# Patient Record
Sex: FEMALE | Race: WHITE | Employment: OTHER
[De-identification: names, ages, dates, MRNs, and addresses within clinical notes are randomized per-mention and may not be internally consistent; named-entity substitution may affect disease eponyms.]

---

## 2017-10-01 ENCOUNTER — HEALTH MAINTENANCE LETTER (OUTPATIENT)
Age: 47
End: 2017-10-01

## 2020-02-23 ENCOUNTER — HEALTH MAINTENANCE LETTER (OUTPATIENT)
Age: 50
End: 2020-02-23

## 2020-12-12 ENCOUNTER — HEALTH MAINTENANCE LETTER (OUTPATIENT)
Age: 50
End: 2020-12-12

## 2021-04-11 ENCOUNTER — HEALTH MAINTENANCE LETTER (OUTPATIENT)
Age: 51
End: 2021-04-11

## 2021-09-26 ENCOUNTER — HEALTH MAINTENANCE LETTER (OUTPATIENT)
Age: 51
End: 2021-09-26

## 2022-01-16 ENCOUNTER — HEALTH MAINTENANCE LETTER (OUTPATIENT)
Age: 52
End: 2022-01-16

## 2022-03-07 ENCOUNTER — TRANSFERRED RECORDS (OUTPATIENT)
Dept: HEALTH INFORMATION MANAGEMENT | Facility: CLINIC | Age: 52
End: 2022-03-07

## 2022-04-14 ENCOUNTER — TRANSFERRED RECORDS (OUTPATIENT)
Dept: OPTOMETRY | Facility: CLINIC | Age: 52
End: 2022-04-14

## 2022-05-08 ENCOUNTER — HEALTH MAINTENANCE LETTER (OUTPATIENT)
Age: 52
End: 2022-05-08

## 2023-01-08 ENCOUNTER — HEALTH MAINTENANCE LETTER (OUTPATIENT)
Age: 53
End: 2023-01-08

## 2023-01-23 ENCOUNTER — LAB (OUTPATIENT)
Dept: FAMILY MEDICINE | Facility: CLINIC | Age: 53
End: 2023-01-23
Attending: NURSE PRACTITIONER
Payer: COMMERCIAL

## 2023-01-23 ENCOUNTER — TELEPHONE (OUTPATIENT)
Dept: FAMILY MEDICINE | Facility: CLINIC | Age: 53
End: 2023-01-23
Payer: COMMERCIAL

## 2023-01-23 ENCOUNTER — E-VISIT (OUTPATIENT)
Dept: URGENT CARE | Facility: CLINIC | Age: 53
End: 2023-01-23
Payer: COMMERCIAL

## 2023-01-23 DIAGNOSIS — Z20.822 SUSPECTED COVID-19 VIRUS INFECTION: Primary | ICD-10-CM

## 2023-01-23 DIAGNOSIS — Z20.822 SUSPECTED COVID-19 VIRUS INFECTION: ICD-10-CM

## 2023-01-23 LAB
DEPRECATED S PYO AG THROAT QL EIA: NEGATIVE
GROUP A STREP BY PCR: NOT DETECTED

## 2023-01-23 PROCEDURE — 87651 STREP A DNA AMP PROBE: CPT

## 2023-01-23 PROCEDURE — U0003 INFECTIOUS AGENT DETECTION BY NUCLEIC ACID (DNA OR RNA); SEVERE ACUTE RESPIRATORY SYNDROME CORONAVIRUS 2 (SARS-COV-2) (CORONAVIRUS DISEASE [COVID-19]), AMPLIFIED PROBE TECHNIQUE, MAKING USE OF HIGH THROUGHPUT TECHNOLOGIES AS DESCRIBED BY CMS-2020-01-R: HCPCS

## 2023-01-23 PROCEDURE — 99421 OL DIG E/M SVC 5-10 MIN: CPT | Mod: CS | Performed by: NURSE PRACTITIONER

## 2023-01-23 PROCEDURE — U0005 INFEC AGEN DETEC AMPLI PROBE: HCPCS

## 2023-01-23 PROCEDURE — 99207 PR NO CHARGE NURSE ONLY: CPT

## 2023-01-23 NOTE — PATIENT INSTRUCTIONS
Dear Rachele,      Based on your responses, you may have COVID-19.     Will I be tested for COVID-19?  We would like to test you for COVID. I have placed orders for this test.     To schedule: go to your [employer] home page and scroll down to the section that says  You have an appointment that needs to be scheduled  and click the large green button that says  Schedule Now  and follow the steps to find the next available openings.    If you are unable to complete these ProofP[employer] scheduling steps, please call [phone] to schedule your testing.     How do I self-isolate?  You isolate when you have symptoms of COVID or a test shows you have COVID, even if you don t have symptoms.     If you DO have symptoms:  o Stay home and away from others  - For at least 5 days after your symptoms started, AND   - You are fever free for 24 hours (with no medicine that reduces fever), AND  - Your other symptoms are better.  o Wear a mask for 10 full days any time you are around others.    If you DON T have symptoms:  o Stay at home and away from others for at least 5 days after your positive test.  o Wear a mask for 10 full days any time you are around others.    How can I take care of myself?  Over the counter medications may help with your symptoms such as runny or stuffy nose, cough, chills, or fever.  Talk to your care team about your options.     Some people are at high risk of severe illness (for example, you have a weak immune system, you re 65 years or older, or you have certain medical problems). If your risk is high and your symptoms started in the last 5 days, we strongly recommend for you to get COVID treatment as soon as possible. Paxlovid and Molnupiravir are proven safe and effective, make you feel better faster, and prevent hospitalization and death.       To schedule an appointment to discuss COVID treatment, request an appointment on [employer] (select  COVID-19 Treatment ) or call BAUTISTA ([phone]).       Get lots of rest. Drink extra fluids (unless a doctor has told you not to)    Take Tylenol (acetaminophen) or ibuprofen for fever or pain. If you have liver or kidney problems, ask your family doctor if it's okay to take Tylenol or ibuprofen    Take over the counter medications for your symptoms, as directed by your doctor. You may also talk to your pharmacist.      If you have other health problems (like cancer, heart failure, an organ transplant or severe kidney disease): Call your specialty clinic if you don't feel better in the next 2 days.    Know when to call 911. Emergency warning signs include:  o Trouble breathing or shortness of breath  o Pain or pressure in the chest that doesn't go away  o Feeling confused like you haven't felt before, or not being able to wake up  o Bluish-colored lips or face    Where can I get more information?     [facility] - About COVID-19: [email or web address]     CDC - What to Do If You're Sick: https://www.cdc.gov/coronavirus/2019-ncov/if-you-are-sick/index.html     CDC -  Isolation https://www.cdc.gov/coronavirus/2019-ncov/your-health/isolation.html

## 2023-01-23 NOTE — TELEPHONE ENCOUNTER
"Patient calling to request some sort of lab test for strep and also mentioned a drug test. Patient reports that she was \"exposed at work, my coworker had strep and Covid\", and she started to have symptoms develop on Thursday.    Patient reports on Thursday she started having a sore throat, cough, and throat was scratchy. She took a Covid test and it was negative. Patient then had a fever of 101.5 Thursday night, and then \"it broke overnight\". Patient denies any fever today, but just has a cough, scratchiness, and did visual like a white spot on her throat. Patient retook a Covid test on Friday and it was negative.     RN advised that patient could start a virtual visit with a provider for further assessment, discuss lab orders, and treatment. RN notes that patient does not have a PCP listed, and that UC was an option for the patient if she could not start an e-visit or virtual visit. Patient verbalized understanding and stated that she will try to start an e-visit or virtual visit for her symptoms. No further questions or concerns.    Malia Arshad RN  [facility]    "

## 2023-01-24 LAB — SARS-COV-2 RNA RESP QL NAA+PROBE: NEGATIVE

## 2023-02-24 ENCOUNTER — OFFICE VISIT (OUTPATIENT)
Dept: FAMILY MEDICINE | Facility: CLINIC | Age: 53
End: 2023-02-24
Payer: COMMERCIAL

## 2023-02-24 VITALS
WEIGHT: 290.6 LBS | HEIGHT: 64 IN | OXYGEN SATURATION: 98 % | TEMPERATURE: 97.5 F | RESPIRATION RATE: 14 BRPM | HEART RATE: 82 BPM | SYSTOLIC BLOOD PRESSURE: 137 MMHG | BODY MASS INDEX: 49.61 KG/M2 | DIASTOLIC BLOOD PRESSURE: 83 MMHG

## 2023-02-24 DIAGNOSIS — Z11.59 NEED FOR HEPATITIS C SCREENING TEST: ICD-10-CM

## 2023-02-24 DIAGNOSIS — Z11.4 SCREENING FOR HIV (HUMAN IMMUNODEFICIENCY VIRUS): ICD-10-CM

## 2023-02-24 DIAGNOSIS — Z13.6 CARDIOVASCULAR SCREENING; LDL GOAL LESS THAN 100: ICD-10-CM

## 2023-02-24 DIAGNOSIS — Z12.31 VISIT FOR SCREENING MAMMOGRAM: ICD-10-CM

## 2023-02-24 DIAGNOSIS — Z13.220 SCREENING FOR HYPERLIPIDEMIA: ICD-10-CM

## 2023-02-24 DIAGNOSIS — Z12.11 SCREEN FOR COLON CANCER: ICD-10-CM

## 2023-02-24 DIAGNOSIS — E66.01 MORBID OBESITY (H): ICD-10-CM

## 2023-02-24 DIAGNOSIS — Z12.4 CERVICAL CANCER SCREENING: ICD-10-CM

## 2023-02-24 DIAGNOSIS — E11.9 TYPE 2 DIABETES MELLITUS WITHOUT COMPLICATION, WITHOUT LONG-TERM CURRENT USE OF INSULIN (H): Primary | ICD-10-CM

## 2023-02-24 PROCEDURE — 99214 OFFICE O/P EST MOD 30 MIN: CPT | Performed by: FAMILY MEDICINE

## 2023-02-24 RX ORDER — LOSARTAN POTASSIUM 25 MG/1
25 TABLET ORAL DAILY
Qty: 90 TABLET | Refills: 3 | Status: SHIPPED | OUTPATIENT
Start: 2023-02-24 | End: 2023-12-04

## 2023-02-24 RX ORDER — METFORMIN HCL 500 MG
1000 TABLET, EXTENDED RELEASE 24 HR ORAL 2 TIMES DAILY WITH MEALS
Qty: 360 TABLET | Refills: 3 | Status: SHIPPED | OUTPATIENT
Start: 2023-02-24 | End: 2023-11-30

## 2023-02-24 ASSESSMENT — PAIN SCALES - GENERAL: PAINLEVEL: NO PAIN (0)

## 2023-02-24 NOTE — PROGRESS NOTES
"  Assessment & Plan     Type 2 diabetes mellitus without complication, without long-term current use of insulin (H)  -HbA1c 6.6- 10 months ago.  Currently on metformin XR 1000 mg twice a day.  -On losartan for renal protection.    - Hemoglobin A1c; Future  - Lipid panel reflex to direct LDL Fasting; Future  - metFORMIN (GLUCOPHAGE XR) 500 MG 24 hr tablet; Take 2 tablets (1,000 mg) by mouth 2 times daily (with meals) for 90 days  - losartan (COZAAR) 25 MG tablet; Take 1 tablet (25 mg) by mouth daily  - blood glucose (NO BRAND SPECIFIED) lancets standard; Use to test blood sugar once daily or as directed.Please dispense 1 box  - blood glucose (NO BRAND SPECIFIED) test strip; Use to test blood sugar once daily or as directed.  - Albumin Creatinine Ratio Urine ([facility])    Visit for screening mammogram    - MA SCREENING DIGITAL BILAT - Future  (s+30); Future    Screen for colon cancer    - Colonoscopy Screening  Referral; Future    Screening for HIV (human immunodeficiency virus)    - HIV Antigen Antibody Combo; Future    Need for hepatitis C screening test    - Hepatitis C Screen Reflex to HCV RNA Quant and Genotype; Future    Cervical cancer screening  -Preferred to schedule GYN visit to get Pap done.  History of abnormal Paps in the past.    Screening for hyperlipidemia      Morbid obesity (H)  -Recommended healthy diet and active lifestyle.    CARDIOVASCULAR SCREENING; LDL GOAL LESS THAN 100    - Hemoglobin; Future  - Basic metabolic panel  (Ca, Cl, CO2, Creat, Gluc, K, Na, BUN); Future           BMI:   Estimated body mass index is 49.86 kg/m  as calculated from the following:    Height as of this encounter: 1.626 m (5' 4.02\").    Weight as of this encounter: 131.8 kg (290 lb 9.6 oz).   Weight management plan: Discussed healthy diet and exercise guidelines        Return in about 3 months (around 5/24/2023) for DM follow up.    Roxana Jensen MD  [facility] " POWER Jeffrey is a 52 year old, presenting for the following health issues:  Diabetes      History of Present Illness       Diabetes:   She presents for follow up of diabetes.  She is checking home blood glucose one time daily. She checks blood glucose before meals.  Blood glucose is never over 200 and never under 70. She is aware of hypoglycemia symptoms including shakiness. She has no concerns regarding her diabetes at this time.  She is not experiencing numbness or burning in feet, excessive thirst, blurry vision, weight changes or redness, sores or blisters on feet.         She eats 2-3 servings of fruits and vegetables daily.She consumes 0 sweetened beverage(s) daily.She exercises with enough effort to increase her heart rate 10 to 19 minutes per day.  She exercises with enough effort to increase her heart rate 3 or less days per week.   She is taking medications regularly.         Review of Systems   Constitutional, HEENT, cardiovascular, pulmonary, gi and gu systems are negative, except as otherwise noted.      Objective    There were no vitals taken for this visit.  There is no height or weight on file to calculate BMI.  Physical Exam   GENERAL: healthy, alert and no distress  NECK: no adenopathy, no asymmetry, masses, or scars and thyroid normal to palpation  RESP: lungs clear to auscultation - no rales, rhonchi or wheezes  CV: regular rate and rhythm, normal S1 S2, no S3 or S4, no murmur, click or rub, no peripheral edema and peripheral pulses strong  ABDOMEN: soft, nontender, no hepatosplenomegaly, no masses and bowel sounds normal  MS: no gross musculoskeletal defects noted, no edema

## 2023-02-24 NOTE — PATIENT INSTRUCTIONS
At [facility], we strive to deliver an exceptional experience to you, every time we see you. If you receive a survey, please complete it as we do value your feedback.  If you have MyChart, you can expect to receive results automatically within 24 hours of their completion.  Your provider will send a note interpreting your results as well.   If you do not have MyChart, you should receive your results in about a week by mail.    Your care team:                            Family Medicine Internal Medicine   MD Edgar Melendez MD Shantel Branch-Fleming, MD Srinivasa Vaka, MD Katya Belousova, PABELKYS Mares CNP, MD ([facility]   José Miguel Sagastume, MD Pauline Peguero MD Amelia Massimini APRN VÍCTOR Cabezas APRN MD Roxana Beckford MD          Clinic hours: Monday - Thursday 7 am-6 pm; Fridays 7 am-5 pm.   Urgent care: Monday - Friday 10 am- 8 pm; Saturday and Sunday 9 am-5 pm.    Clinic: [phone]       [facility]: Monday - Thursday 8 am - 7 pm; Friday 8 am - 6 pm  [facility]: [phone]

## 2023-03-02 DIAGNOSIS — E11.9 TYPE 2 DIABETES MELLITUS WITHOUT COMPLICATION, WITHOUT LONG-TERM CURRENT USE OF INSULIN (H): Primary | ICD-10-CM

## 2023-03-08 ENCOUNTER — OFFICE VISIT (OUTPATIENT)
Dept: OPTOMETRY | Facility: CLINIC | Age: 53
End: 2023-03-08
Payer: COMMERCIAL

## 2023-03-08 DIAGNOSIS — H52.31 ANISOMETROPIA: ICD-10-CM

## 2023-03-08 DIAGNOSIS — E11.9 TYPE 2 DIABETES MELLITUS WITHOUT COMPLICATION, WITHOUT LONG-TERM CURRENT USE OF INSULIN (H): Primary | ICD-10-CM

## 2023-03-08 DIAGNOSIS — H52.222 REGULAR ASTIGMATISM OF LEFT EYE: ICD-10-CM

## 2023-03-08 DIAGNOSIS — H52.4 PRESBYOPIA: ICD-10-CM

## 2023-03-08 DIAGNOSIS — H40.039 ANATOMICAL NARROW ANGLE: ICD-10-CM

## 2023-03-08 DIAGNOSIS — H52.03 HYPEROPIA, BILATERAL: ICD-10-CM

## 2023-03-08 PROCEDURE — 92015 DETERMINE REFRACTIVE STATE: CPT | Performed by: OPTOMETRIST

## 2023-03-08 PROCEDURE — 92004 COMPRE OPH EXAM NEW PT 1/>: CPT | Performed by: OPTOMETRIST

## 2023-03-08 ASSESSMENT — CUP TO DISC RATIO
OS_RATIO: 0.2
OD_RATIO: 0.2

## 2023-03-08 ASSESSMENT — VISUAL ACUITY
OD_SC+: -1
OS_PH_SC+: -1
METHOD: SNELLEN - LINEAR
OS_CC: 20/30
OD_SC: 20/70-1
OS_SC+: -2
CORRECTION_TYPE: GLASSES
OD_PH_SC: 20/30
OS_SC: 20/80
OD_PH_SC+: -1
OD_SC: 20/50
OS_PH_SC: 20/40
OD_CC: 20/20-2
OS_SC: 20/200

## 2023-03-08 ASSESSMENT — CONF VISUAL FIELD
OD_NORMAL: 1
OD_SUPERIOR_NASAL_RESTRICTION: 0
METHOD: COUNTING FINGERS
OD_INFERIOR_NASAL_RESTRICTION: 0
OS_SUPERIOR_TEMPORAL_RESTRICTION: 0
OD_INFERIOR_TEMPORAL_RESTRICTION: 0
OS_INFERIOR_NASAL_RESTRICTION: 0
OS_SUPERIOR_NASAL_RESTRICTION: 0
OS_NORMAL: 1
OS_INFERIOR_TEMPORAL_RESTRICTION: 0
OD_SUPERIOR_TEMPORAL_RESTRICTION: 0

## 2023-03-08 ASSESSMENT — REFRACTION_MANIFEST
OS_AXIS: 173
OS_SPHERE: +3.50
OD_SPHERE: +1.50
OS_CYLINDER: +0.75
OD_SPHERE: +1.25
OS_SPHERE: +3.25
OS_CYLINDER: +0.75
OS_AXIS: 180
OD_CYLINDER: SPHERE
OD_ADD: +2.50
OS_ADD: +2.50
METHOD_AUTOREFRACTION: 1

## 2023-03-08 ASSESSMENT — REFRACTION_WEARINGRX
OS_SPHERE: +3.00
OS_ADD: +1.50
OD_ADD: +1.50
OS_AXIS: 165
OS_CYLINDER: +0.50
OD_CYLINDER: SPHERE
OD_SPHERE: +1.75

## 2023-03-08 ASSESSMENT — EXTERNAL EXAM - LEFT EYE: OS_EXAM: NORMAL

## 2023-03-08 ASSESSMENT — TONOMETRY
OD_IOP_MMHG: 18
IOP_METHOD: APPLANATION
OS_IOP_MMHG: 18

## 2023-03-08 ASSESSMENT — KERATOMETRY
OS_K2POWER_DIOPTERS: 43.75
OS_AXISANGLE2_DEGREES: 173
OD_AXISANGLE2_DEGREES: 177
OD_K2POWER_DIOPTERS: 43.75
OS_K1POWER_DIOPTERS: 43.00
OD_K1POWER_DIOPTERS: 42.50

## 2023-03-08 ASSESSMENT — SLIT LAMP EXAM - LIDS
COMMENTS: NORMAL
COMMENTS: NORMAL

## 2023-03-08 ASSESSMENT — EXTERNAL EXAM - RIGHT EYE: OD_EXAM: NORMAL

## 2023-03-08 NOTE — LETTER
3/8/2023         RE: Rachele Marquis  [address] [facility] [address]        Dear Colleague,    Thank you for referring your patient, Rachele Marquis, to the [facility]. No diabetic retinopathy was found at eye exam.  Please see a copy of my visit note below.    Chief Complaint   Patient presents with     Diabetic Eye Exam        Chief Complaint(s) and History of Present Illness(es)     Diabetic Eye Exam            Vision: is stable    Diabetes Type: Type 2 and taking oral medications    Blood Sugars: is controlled               HP  A1C 6.6  4/28/22      Last Eye Exam: Jan/Feb 2022  Dilated Previously: Yes    What are you currently using to see?  Glasses for work/computer   Distance Vision Acuity: Satisfied with vision, fine. Has started to notice that the  in [place] has gotten blurry     Near Vision Acuity: Satisfied with vision while reading  with readers, thinks that they are +2.50's and Satisfied with vision while using computer with computer glasses    Eye Comfort: good  Do you use eye drops? : No  Occupation or Hobbies:  3 screens at work     Eveline Apple Optometric Assistant      Medical, surgical and family histories reviewed and updated 3/8/2023.       OBJECTIVE: See Ophthalmology exam    ASSESSMENT:    ICD-10-CM    1. Type 2 diabetes mellitus without complication, without long-term current use of insulin (H)  E11.9 EYE EXAM (SIMPLE-NONBILLABLE)     REFRACTION    no diabetic retinopathy found at eye exam       2. Anatomical narrow angle  H40.039 EYE EXAM (SIMPLE-NONBILLABLE)     REFRACTION    hx of laser trabeculoplasty 2022      3. Hyperopia, bilateral  H52.03 EYE EXAM (SIMPLE-NONBILLABLE)     REFRACTION      4. Regular astigmatism of left eye  H52.222 EYE EXAM (SIMPLE-NONBILLABLE)     REFRACTION      5. Presbyopia  H52.4 EYE EXAM (SIMPLE-NONBILLABLE)     REFRACTION      6. Anisometropia  H52.31 EYE EXAM (SIMPLE-NONBILLABLE)     REFRACTION           PLAN:    Rachele Marquis aware  eye exam results will be sent to No Ref-Primary, Physician.  Patient Instructions   Fill glasses prescription  Allow 2 weeks to adapt to change in glasses  Keep blood sugar under good control  Return in 1 year for diabetic eye exam      Blood sugar and blood pressure control are very important in the prevention of ocular complications from diabetes.       Jaclyn Valencia, OD  [phone]                        Again, thank you for allowing me to participate in the care of your patient.        Sincerely,        Jaclyn Valencia, OD

## 2023-03-08 NOTE — PROGRESS NOTES
Chief Complaint   Patient presents with     Diabetic Eye Exam        Chief Complaint(s) and History of Present Illness(es)     Diabetic Eye Exam            Vision: is stable    Diabetes Type: Type 2 and taking oral medications    Blood Sugars: is controlled               HP  A1C 6.6  4/28/22      Last Eye Exam: Jan/Feb 2022  Dilated Previously: Yes    What are you currently using to see?  Glasses for work/computer   Distance Vision Acuity: Satisfied with vision, fine. Has started to notice that the  in [place] has gotten blurry     Near Vision Acuity: Satisfied with vision while reading  with readers, thinks that they are +2.50's and Satisfied with vision while using computer with computer glasses    Eye Comfort: good  Do you use eye drops? : No  Occupation or Hobbies:  3 screens at work     Eveline Apple Optometric Assistant      Medical, surgical and family histories reviewed and updated 3/8/2023.       OBJECTIVE: See Ophthalmology exam    ASSESSMENT:    ICD-10-CM    1. Type 2 diabetes mellitus without complication, without long-term current use of insulin (H)  E11.9 EYE EXAM (SIMPLE-NONBILLABLE)     REFRACTION    no diabetic retinopathy found at eye exam       2. Anatomical narrow angle  H40.039 EYE EXAM (SIMPLE-NONBILLABLE)     REFRACTION    hx of laser trabeculoplasty 2022      3. Hyperopia, bilateral  H52.03 EYE EXAM (SIMPLE-NONBILLABLE)     REFRACTION      4. Regular astigmatism of left eye  H52.222 EYE EXAM (SIMPLE-NONBILLABLE)     REFRACTION      5. Presbyopia  H52.4 EYE EXAM (SIMPLE-NONBILLABLE)     REFRACTION      6. Anisometropia  H52.31 EYE EXAM (SIMPLE-NONBILLABLE)     REFRACTION          PLAN:    Rachele Marquis aware  eye exam results will be sent to No Ref-Primary, Physician.  Patient Instructions   Fill glasses prescription  Allow 2 weeks to adapt to change in glasses  Keep blood sugar under good control  Return in 1 year for diabetic eye exam      Blood sugar and blood  pressure control are very important in the prevention of ocular complications from diabetes.       Jaclyn Valencia, OD  [phone]

## 2023-03-08 NOTE — PATIENT INSTRUCTIONS
Fill glasses prescription  Allow 2 weeks to adapt to change in glasses  Keep blood sugar under good control  Return in 1 year for diabetic eye exam      Blood sugar and blood pressure control are very important in the prevention of ocular complications from diabetes.       Jaclyn Valencia, OD  [phone]

## 2023-03-29 ENCOUNTER — TELEPHONE (OUTPATIENT)
Dept: FAMILY MEDICINE | Facility: CLINIC | Age: 53
End: 2023-03-29
Payer: COMMERCIAL

## 2023-03-29 NOTE — TELEPHONE ENCOUNTER
Routing to provider to clarify, as most recent prescription for lancets (3/2/23) has sig as TID, whereas recent test strips (2/24/23) are for daily. To provider - how often do you want patient to test blood glucoses at home, 3x/day or daily?          GEOVANY JimenesN, RN  [facility]

## 2023-03-29 NOTE — TELEPHONE ENCOUNTER
Fax from pharmacy :    Please clarify frequency of use for lancets, test strips indicate testing once daily?

## 2023-03-30 NOTE — TELEPHONE ENCOUNTER
Routing to TC team to please fax [facility] with update from provider below. Thank you.     Shani Villatoro, GEOVANYN, RN  [facility]

## 2023-04-23 ENCOUNTER — HEALTH MAINTENANCE LETTER (OUTPATIENT)
Age: 53
End: 2023-04-23

## 2023-05-23 ENCOUNTER — TELEPHONE (OUTPATIENT)
Dept: GASTROENTEROLOGY | Facility: CLINIC | Age: 53
End: 2023-05-23
Payer: COMMERCIAL

## 2023-05-23 NOTE — TELEPHONE ENCOUNTER
Screening Questions  BLUE  KIND OF PREP RED  LOCATION [review exclusion criteria] GREEN  SEDATION TYPE        Y Are you active on mychart?       Roxana Fajardo Ordering/Referring Provider?        [facility] What type of coverage do you have?      N Have you had a positive covid test in the last 14 days?     49.86 1. BMI  [BMI 40+ - review exclusion criteria& smart-phrase document]    Y  2. Are you able to give consent for your medical care? [IF NO,RN REVIEW]          N  3. Are you taking any prescription pain medications on a routine schedule   (ex narcotics: oxycodone, roxicodone, oxycontin,  and percocet)? [RN Review]        N  3a. EXTENDED PREP What kind of prescription?     N 4. Do you have any chemical dependencies such as alcohol, street drugs, or methadone?        **If yes 3- 5 , please schedule with MAC sedation.**          IF YES TO ANY 6 - 10 - HOSPITAL SETTING ONLY.     N 6.   Do you need assistance transferring?     N 7.   Have you had a heart or lung transplant?    N 8.   Are you currently on dialysis?   N 9.   Do you use daily home oxygen?   N 10. Do you take nitroglycerin?   10a. N If yes, how often?     N 11. Are you currently pregnant?    11a. N If yes, how many weeks? [ Greater than 12 weeks, OR NEEDED]    N 12. Do you have Pulmonary Hypertension? *NEED PAC APPT AT [facility] w/ [facility]*     N 13. [review exclusion criteria]  Do you have any implantable devices in your body (pacemaker, defib, LVAD)?    N 14. In the past 6 months, have you had any heart related issues including cardiomyopathy or heart attack?     14a. N If yes, did it require cardiac stenting if so when?     N 15. Have you had a stroke or Transient ischemic attack (TIA - aka  mini stroke ) within 6 months?      N 16. Do you have mod to severe Obstructive Sleep Apnea?  [Hospital only]    N 17. Do you have SEVERE AND UNCONTROLLED asthma? *NEED PAC APPT AT [facility] w/[facility]*     18.Do you take blood thinners?  No    N 19. Do you take  "any of the following medications?    Phentermine    Ozempic    Wegovy (Semaglutide)      19a. If yes, \"Hold for 7 days before procedure.  Please consult your prescribing provider if you have questions about holding this medication.\"     N  20. Do you have chronic kidney disease?      Y TYPE 2  21. Do you have a diagnosis of diabetes?     N  22. On a regular basis do you go 3-5 days between bowel movements?      23. Preferred LOCAL Pharmacy for Pre Prescription         [id number] [place] [place] - [address] [facility]          - CLOSING REMINDERS -    You will receive a call from a Nurse to review instructions and health history.  This assessment must be completed prior to your procedure.  Failure to complete the Nurse assessment may result in the procedure being cancelled.      On the day of your procedure, please designatean adult(s) who can drive you home stay with you for the next 24 hours. The medicines used in the exam will make you sleepy. You will not be able to drive.      You cannot take public transportation, ride share services, or non-medical taxi service without a responsible caregiver.  Medical transport services are allowed with the requirement that a responsible caregiver will receive you at your destination.  We require that drivers and caregivers are confirmed prior to your procedure.      - SCHEDULING DETAILS -  N & N Hospital Setting Required & If yes, what is the exclusion?   MALLORY  Surgeon    7/27/23  Date of Procedure  Lower Endoscopy [Colonoscopy]  Type of Procedure Scheduled  [facility] EXTENDED - If you answer yes to questions #1, #3, #22 (De Isaias and CF pts)Which Colonoscopy Prep was Sent?     MODERATE Sedation Type     N PAC / Pre-op Required                 "

## 2023-07-11 ENCOUNTER — TELEPHONE (OUTPATIENT)
Dept: FAMILY MEDICINE | Facility: CLINIC | Age: 53
End: 2023-07-11
Payer: COMMERCIAL

## 2023-07-11 NOTE — TELEPHONE ENCOUNTER
Patient calling in requesting that Lancets be switched to daily instead of 3x/day (as currently in med list) as she is only checking daily as discussed with provider, and since the test strips are written as daily, [facility] will not dispense Lancets until clarification is given re: how often she should be checking BGs - 3x/day or daily.    Writer reviewed chart, noted that back on 3/29/23, provider did give verbal OK for patient to check daily for both lancets and strips, but prescription was accidentally sent for 3x/day:        Informed patient we will reach out to [facility] to give them the verbal approval from provider to switch Lancet script from 3x/day to daily per provider recommendation. Patient verbalized understanding.    Called [facility] - spoke with pharmacist there and gave verbal order from provider for once daily testing for lancets and strips. Pharmacist verbalized understanding, will change sig for lancets and dispense 90 day supply. No further questions or concerns at this time.      Kailyn Moctezuma, GEOVANYN, RN  [facility]

## 2023-07-18 ENCOUNTER — TELEPHONE (OUTPATIENT)
Dept: GASTROENTEROLOGY | Facility: CLINIC | Age: 53
End: 2023-07-18
Payer: COMMERCIAL

## 2023-07-18 NOTE — TELEPHONE ENCOUNTER
ERVIN  Caller: Rachele Marquis     Reason for Reschedule/Cancellation (please be detailed, any staff messages or encounters to note?): Mallory VELOZ, case moved into [facility] for call back to reschedule, Stat message sent        Prior to reschedule please review:    Ordering Provider: Roxana Fajardo MD     Sedation per order: MODERATE    Does patient have any ASC Exclusions, please identify?: NO        Notes on Cancelled Procedure:    Procedure: Lower Endoscopy [Colonoscopy]     Date: 7/27    Location: [facility]; [address], [place] [address]    Surgeon: MALLORY        Rescheduled: No                  Send In - basket message to Panc - Eleazar Pool if EUS  procedure is canceled or rescheduled: [ N/A, YES or NO] N/A                                           BASHIR  Rescheduled: Yes    Procedure: Lower Endoscopy [Colonoscopy]     Date: 8/9    Location: [facility]; [address], [place] [id number]    Surgeon: TRACEY    Sedation Level Scheduled  CS,  Reason for Sedation Level ORDER    Prep/Instructions updated and sent: N     Send In - basket message to Panc - Eleazar Pool if EUS  procedure is canceled or rescheduled: [ N/A, YES or NO]

## 2023-07-18 NOTE — TELEPHONE ENCOUNTER
Caller: Rachele Marquis    Reason for Reschedule/Cancellation (please be detailed, any staff messages or encounters to note?): Mallory VELOZ, case moved into [facility] for call back to reschedule, Zuse message sent      Prior to reschedule please review:    Ordering Provider: Roxana Fajardo MD     Sedation per order: MODERATE    Does patient have any ASC Exclusions, please identify?: NO      Notes on Cancelled Procedure:    Procedure: Lower Endoscopy [Colonoscopy]     Date: 7/27    Location: [facility]; [address], [place] [address]    Surgeon: MALLORY      Rescheduled: No       Send In - basket message to Panc - Eleazar Pool if EUS  procedure is canceled or rescheduled: [ N/A, YES or NO] N/A

## 2023-07-19 ENCOUNTER — TELEPHONE (OUTPATIENT)
Dept: GASTROENTEROLOGY | Facility: CLINIC | Age: 53
End: 2023-07-19
Payer: COMMERCIAL

## 2023-07-19 NOTE — TELEPHONE ENCOUNTER
Caller: Rachele  Reason for Reschedule/Cancellation (please be detailed, any staff messages or encounters to note?): Out of town      Prior to reschedule please review:    Ordering Provider: Ankit     Sedation per order: Moderate    Does patient have any ASC Exclusions, please identify?: N      Notes on Cancelled Procedure:    Procedure: Lower Endoscopy [Colonoscopy]     Date: 8/9/23    Location: [facility]; [address], [place] [address]    Surgeon: Juana      Rescheduled: Yes    Procedure: Lower Endoscopy [Colonoscopy]     Date: 8/16/23    Location: [facility]; [address], [place] [address]    Surgeon: Jaz    Sedation Level Scheduled  Moderate,  Reason for Sedation Level Per order    Prep/Instructions updated and sent: Yes     Send In - basket message to Panc - Eleazar Pool if EUS  procedure is canceled or rescheduled: [ N/A, YES or NO] N/A

## 2023-08-02 ENCOUNTER — TELEPHONE (OUTPATIENT)
Dept: GASTROENTEROLOGY | Facility: CLINIC | Age: 53
End: 2023-08-02
Payer: COMMERCIAL

## 2023-08-02 DIAGNOSIS — Z12.11 SCREEN FOR COLON CANCER: Primary | ICD-10-CM

## 2023-08-02 RX ORDER — BISACODYL 5 MG/1
TABLET, DELAYED RELEASE ORAL
Qty: 4 TABLET | Refills: 0 | Status: SHIPPED | OUTPATIENT
Start: 2023-08-02 | End: 2023-08-29

## 2023-08-02 NOTE — TELEPHONE ENCOUNTER
Attempted to contact patient in order to complete pre assessment questions.     No answer. Left message to return call to [phone] option 4      Shani Vital RN  Endoscopy Procedure Pre Assessment RN

## 2023-08-02 NOTE — TELEPHONE ENCOUNTER
Pre visit planning completed.      Procedure details:    Patient scheduled for Colonoscopy  on 08.16.2023.     Arrival time: 0715. Procedure time 0800    Pre op exam needed? N/A    Facility location: [facility]; [address], [place] [address]    Sedation type: Conscious sedation     Indication for procedure: Screen for colon cancer       Chart review:     Electronic implanted devices? No    Diabetic? Yes. See medication holding recommendations     Diabetic medication HOLDING recommendations: (if applicable)  Oral diabetic medications: Yes:  Metformin (glucophage): HOLD day of procedure.  Diabetic injectables: N/A  Insulin: N/A      Medication review:    Anticoagulants? No    NSAIDS? No NSAID medications per patient's medication list.  RN will verify with pre-assessment call.    Other medication HOLDING recommendations:  N/A      Prep for procedure:     Bowel prep recommendation: Extended prep Golytely  -discuss recent height and weight d/t last recorded was BMI 49.86 in 02.2023  Due to:  BMI > 40.  and diabetes.     Prep instructions sent via [email or web address] Bowel prep script sent to    [facility] [address] [place], [place] - [address]        Shani Vital RN  Endoscopy Procedure Pre Assessment RN  [phone] option 4

## 2023-08-03 NOTE — TELEPHONE ENCOUNTER
"BMI verified with patient.  Ht: 5' 4\"  Wt (pt reported): 290    BMI=49.8    Pre assessment completed for upcoming procedure.   (Please see previous telephone encounter notes for complete details)    Patient  returned call.       Procedure details:    Arrival time and facility location reviewed    Pre op exam needed? N/A    Designated  policy reviewed. Instructed to have someone stay 6 hours post procedure.     COVID policy reviewed.      Medication review:    Medications reviewed. Please see supporting documentation below. Holding recommendations discussed (if applicable).       Prep for procedure:     Reviewed procedure prep instructions.      Patient  verbalized understanding and had no questions or concerns at this time.      Alley Munoz RN  Endoscopy Procedure Pre Assessment RN  [phone] option 4    "

## 2023-08-14 ENCOUNTER — TELEPHONE (OUTPATIENT)
Dept: GASTROENTEROLOGY | Facility: CLINIC | Age: 53
End: 2023-08-14
Payer: COMMERCIAL

## 2023-08-14 NOTE — TELEPHONE ENCOUNTER
Patient's  called stating pt is having sinus congestion s/sx.  Home COVID test is negative.  No fever.    RN stated that as long as COVID test is negative and pt is fever free they could proceed with their procedure.    Pt has no further questions or concerns.      Alley Munoz RN

## 2023-08-14 NOTE — TELEPHONE ENCOUNTER
Caller: Rachele  Reason for Reschedule/Cancellation (please be detailed, any staff messages or encounters to note?): Pt has temp and is sick       Prior to reschedule please review:  Ordering Provider:     Roxana Fajardo MD in [facility] FP/IM/PEDS     Sedation per order: Moderate  Does patient have any ASC Exclusions, please identify?: n      Notes on Cancelled Procedure:  Procedure: Lower Endoscopy [Colonoscopy]   Date: 08/16/2023  Location: [facility]; [address], [place] [address]  Surgeon: Jaz      Rescheduled: Yes  Procedure: Lower Endoscopy [Colonoscopy]   Date: 08/29/2023  Location: [facility]; [address], [place] [address]  Surgeon: Juana  Sedation Level Scheduled  moderate,  Reason for Sedation Level per order  Prep/Instructions updated and sent: y     Send In - basket message to Panc - Eleazar Pool if EUS  procedure is canceled or rescheduled: [ N/A, YES or NO] na

## 2023-08-25 RX ORDER — BISACODYL 5 MG/1
TABLET, DELAYED RELEASE ORAL
Qty: 4 TABLET | Refills: 0 | Status: SHIPPED | OUTPATIENT
Start: 2023-08-25 | End: 2023-08-29

## 2023-08-29 ENCOUNTER — HOSPITAL ENCOUNTER (OUTPATIENT)
Facility: AMBULATORY SURGERY CENTER | Age: 53
Discharge: HOME | End: 2023-08-29
Attending: SURGERY | Admitting: SURGERY
Payer: COMMERCIAL

## 2023-08-29 VITALS
HEART RATE: 85 BPM | OXYGEN SATURATION: 98 % | RESPIRATION RATE: 16 BRPM | TEMPERATURE: 97.1 F | DIASTOLIC BLOOD PRESSURE: 82 MMHG | SYSTOLIC BLOOD PRESSURE: 121 MMHG

## 2023-08-29 LAB
COLONOSCOPY: NORMAL
GLUCOSE BLDC GLUCOMTR-MCNC: 122 MG/DL (ref 70–99)

## 2023-08-29 PROCEDURE — 45385 COLONOSCOPY W/LESION REMOVAL: CPT

## 2023-08-29 PROCEDURE — 99152 MOD SED SAME PHYS/QHP 5/>YRS: CPT | Mod: PT | Performed by: SURGERY

## 2023-08-29 PROCEDURE — G8907 PT DOC NO EVENTS ON DISCHARG: HCPCS

## 2023-08-29 PROCEDURE — G8918 PT W/O PREOP ORDER IV AB PRO: HCPCS

## 2023-08-29 PROCEDURE — 45385 COLONOSCOPY W/LESION REMOVAL: CPT | Mod: PT | Performed by: SURGERY

## 2023-08-29 PROCEDURE — 88305 TISSUE EXAM BY PATHOLOGIST: CPT | Performed by: PATHOLOGY

## 2023-08-29 RX ORDER — ONDANSETRON 2 MG/ML
4 INJECTION INTRAMUSCULAR; INTRAVENOUS EVERY 6 HOURS PRN
Status: DISCONTINUED | OUTPATIENT
Start: 2023-08-29 | End: 2023-08-30 | Stop reason: HOSPADM

## 2023-08-29 RX ORDER — ONDANSETRON 4 MG/1
4 TABLET, ORALLY DISINTEGRATING ORAL EVERY 6 HOURS PRN
Status: DISCONTINUED | OUTPATIENT
Start: 2023-08-29 | End: 2023-08-30 | Stop reason: HOSPADM

## 2023-08-29 RX ORDER — NALOXONE HYDROCHLORIDE 0.4 MG/ML
0.4 INJECTION, SOLUTION INTRAMUSCULAR; INTRAVENOUS; SUBCUTANEOUS
Status: DISCONTINUED | OUTPATIENT
Start: 2023-08-29 | End: 2023-08-30 | Stop reason: HOSPADM

## 2023-08-29 RX ORDER — NALOXONE HYDROCHLORIDE 0.4 MG/ML
0.2 INJECTION, SOLUTION INTRAMUSCULAR; INTRAVENOUS; SUBCUTANEOUS
Status: DISCONTINUED | OUTPATIENT
Start: 2023-08-29 | End: 2023-08-30 | Stop reason: HOSPADM

## 2023-08-29 RX ORDER — PROCHLORPERAZINE MALEATE 10 MG
10 TABLET ORAL EVERY 6 HOURS PRN
Status: DISCONTINUED | OUTPATIENT
Start: 2023-08-29 | End: 2023-08-30 | Stop reason: HOSPADM

## 2023-08-29 RX ORDER — FENTANYL CITRATE 50 UG/ML
INJECTION, SOLUTION INTRAMUSCULAR; INTRAVENOUS PRN
Status: DISCONTINUED | OUTPATIENT
Start: 2023-08-29 | End: 2023-08-29 | Stop reason: HOSPADM

## 2023-08-29 RX ORDER — LIDOCAINE 40 MG/G
CREAM TOPICAL
Status: DISCONTINUED | OUTPATIENT
Start: 2023-08-29 | End: 2023-08-30 | Stop reason: HOSPADM

## 2023-08-29 RX ORDER — ONDANSETRON 2 MG/ML
4 INJECTION INTRAMUSCULAR; INTRAVENOUS
Status: DISCONTINUED | OUTPATIENT
Start: 2023-08-29 | End: 2023-08-30 | Stop reason: HOSPADM

## 2023-08-29 RX ORDER — FLUMAZENIL 0.1 MG/ML
0.2 INJECTION, SOLUTION INTRAVENOUS
Status: ACTIVE | OUTPATIENT
Start: 2023-08-29 | End: 2023-08-29

## 2023-08-29 NOTE — LETTER
Rachele Marquis  [address] [facility] [address]  September 1, 2023    Dear Rachele,  This letter is to inform you of the results of your pathology report from your colonoscopy.  Your pathology report was:  Final Diagnosis   SIGMOID COLON, POLYP, BIOPSY:  Tubular adenoma, no high grade dysplasia   These are benign polyps. These types of polyps do carry a small risk of developing into a cancer over time if not removed. Yours were completely removed at the time of your colonoscopy. You should have another surveillance colonoscopy in 7 years.  If you have further questions please don t hesitate to call our clinic at [phone].   Sincerely,     Elias Arias, DO

## 2023-08-31 LAB
PATH REPORT.COMMENTS IMP SPEC: NORMAL
PATH REPORT.COMMENTS IMP SPEC: NORMAL
PATH REPORT.FINAL DX SPEC: NORMAL
PATH REPORT.GROSS SPEC: NORMAL
PATH REPORT.MICROSCOPIC SPEC OTHER STN: NORMAL
PATH REPORT.RELEVANT HX SPEC: NORMAL
PHOTO IMAGE: NORMAL

## 2023-09-24 ENCOUNTER — HEALTH MAINTENANCE LETTER (OUTPATIENT)
Age: 53
End: 2023-09-24

## 2023-10-11 ENCOUNTER — ANCILLARY PROCEDURE (OUTPATIENT)
Dept: MAMMOGRAPHY | Facility: CLINIC | Age: 53
End: 2023-10-11
Payer: COMMERCIAL

## 2023-10-11 ENCOUNTER — ANCILLARY ORDERS (OUTPATIENT)
Dept: MAMMOGRAPHY | Facility: CLINIC | Age: 53
End: 2023-10-11

## 2023-10-11 DIAGNOSIS — Z12.31 VISIT FOR SCREENING MAMMOGRAM: ICD-10-CM

## 2023-10-11 PROCEDURE — 77067 SCR MAMMO BI INCL CAD: CPT

## 2023-10-12 ENCOUNTER — ANCILLARY ORDERS (OUTPATIENT)
Dept: RADIOLOGY | Facility: CLINIC | Age: 53
End: 2023-10-12

## 2023-10-27 ENCOUNTER — ALLIED HEALTH/NURSE VISIT (OUTPATIENT)
Dept: NURSING | Facility: CLINIC | Age: 53
End: 2023-10-27
Payer: COMMERCIAL

## 2023-10-27 DIAGNOSIS — Z23 ENCOUNTER FOR IMMUNIZATION: Primary | ICD-10-CM

## 2023-10-27 PROCEDURE — 90686 IIV4 VACC NO PRSV 0.5 ML IM: CPT

## 2023-10-27 PROCEDURE — 90471 IMMUNIZATION ADMIN: CPT

## 2023-10-27 PROCEDURE — 91320 SARSCV2 VAC 30MCG TRS-SUC IM: CPT

## 2023-10-27 PROCEDURE — 90480 ADMN SARSCOV2 VAC 1/ONLY CMP: CPT

## 2023-10-27 PROCEDURE — 99207 PR NO CHARGE NURSE ONLY: CPT

## 2023-10-27 NOTE — PROGRESS NOTES
Prior to immunization administration, verified patients identity using patient s name and date of birth. Please see Immunization Activity for additional information.     Screening Questionnaire for Adult Immunization    Are you sick today?   No   Do you have allergies to medications, food, a vaccine component or latex?   No   Have you ever had a serious reaction after receiving a vaccination?   No   Do you have a long-term health problem with heart, lung, kidney, or metabolic disease (e.g., diabetes), asthma, a blood disorder, no spleen, complement component deficiency, a cochlear implant, or a spinal fluid leak?  Are you on long-term aspirin therapy?   No   Do you have cancer, leukemia, HIV/AIDS, or any other immune system problem?   No   Do you have a parent, brother, or sister with an immune system problem?   No   In the past 3 months, have you taken medications that affect  your immune system, such as prednisone, other steroids, or anticancer drugs; drugs for the treatment of rheumatoid arthritis, Crohn s disease, or psoriasis; or have you had radiation treatments?   No   Have you had a seizure, or a brain or other nervous system problem?   No   During the past year, have you received a transfusion of blood or blood    products, or been given immune (gamma) globulin or antiviral drug?   No   For women: Are you pregnant or is there a chance you could become       pregnant during the next month?   No   Have you received any vaccinations in the past 4 weeks?   No     Immunization questionnaire answers were all negative.    I have reviewed the following standing orders:   This patient is due and qualifies for the Covid-19 vaccine.     Click here for COVID-19 Standing Order    I have reviewed the vaccines inclusion and exclusion criteria; No concerns regarding eligibility.     This patient is due and qualifies for the Influenza vaccine.    Click here for Influenza Vaccine Standing Order    I have reviewed the  vaccines inclusion and exclusion criteria; No concerns regarding eligibility.     Patient instructed to remain in clinic for 15 minutes afterwards, and to report any adverse reactions.     Screening performed by Christianne Arce CMA on 10/27/2023 at 8:34 AM.

## 2023-11-27 ASSESSMENT — ENCOUNTER SYMPTOMS
SORE THROAT: 0
ARTHRALGIAS: 1
SHORTNESS OF BREATH: 0
FREQUENCY: 0
PARESTHESIAS: 0
HEMATURIA: 0
WEAKNESS: 0
NERVOUS/ANXIOUS: 0
MYALGIAS: 0
BREAST MASS: 0
ABDOMINAL PAIN: 0
EYE PAIN: 0
DYSURIA: 0
CONSTIPATION: 0
CHILLS: 0
FEVER: 0
COUGH: 0
DIARRHEA: 0
HEARTBURN: 1
PALPITATIONS: 0
HEMATOCHEZIA: 0
HEADACHES: 0
NAUSEA: 0
JOINT SWELLING: 0
DIZZINESS: 0

## 2023-11-30 ENCOUNTER — OFFICE VISIT (OUTPATIENT)
Dept: FAMILY MEDICINE | Facility: CLINIC | Age: 53
End: 2023-11-30
Payer: COMMERCIAL

## 2023-11-30 VITALS
WEIGHT: 291 LBS | DIASTOLIC BLOOD PRESSURE: 89 MMHG | HEIGHT: 64 IN | RESPIRATION RATE: 18 BRPM | BODY MASS INDEX: 49.68 KG/M2 | TEMPERATURE: 97 F | HEART RATE: 99 BPM | OXYGEN SATURATION: 97 % | SYSTOLIC BLOOD PRESSURE: 133 MMHG

## 2023-11-30 DIAGNOSIS — E66.01 MORBID OBESITY (H): ICD-10-CM

## 2023-11-30 DIAGNOSIS — Z11.4 SCREENING FOR HIV (HUMAN IMMUNODEFICIENCY VIRUS): ICD-10-CM

## 2023-11-30 DIAGNOSIS — N95.1 PERIMENOPAUSE: ICD-10-CM

## 2023-11-30 DIAGNOSIS — E78.5 HYPERLIPIDEMIA LDL GOAL <100: ICD-10-CM

## 2023-11-30 DIAGNOSIS — Z00.00 ROUTINE HISTORY AND PHYSICAL EXAMINATION OF ADULT: Primary | ICD-10-CM

## 2023-11-30 DIAGNOSIS — Z11.59 NEED FOR HEPATITIS C SCREENING TEST: ICD-10-CM

## 2023-11-30 DIAGNOSIS — E11.9 TYPE 2 DIABETES MELLITUS WITHOUT COMPLICATION, WITHOUT LONG-TERM CURRENT USE OF INSULIN (H): ICD-10-CM

## 2023-11-30 LAB
ANION GAP SERPL CALCULATED.3IONS-SCNC: 10 MMOL/L (ref 7–15)
BUN SERPL-MCNC: 12.9 MG/DL (ref 6–20)
CALCIUM SERPL-MCNC: 9.8 MG/DL (ref 8.6–10)
CHLORIDE SERPL-SCNC: 100 MMOL/L (ref 98–107)
CHOLEST SERPL-MCNC: 185 MG/DL
CREAT SERPL-MCNC: 0.56 MG/DL (ref 0.51–0.95)
CREAT UR-MCNC: 193 MG/DL
DEPRECATED HCO3 PLAS-SCNC: 28 MMOL/L (ref 22–29)
EGFRCR SERPLBLD CKD-EPI 2021: >90 ML/MIN/1.73M2
FSH SERPL IRP2-ACNC: 67.7 MIU/ML
GLUCOSE SERPL-MCNC: 150 MG/DL (ref 70–99)
HBA1C MFR BLD: 7 % (ref 0–5.6)
HCV AB SERPL QL IA: NONREACTIVE
HDLC SERPL-MCNC: 71 MG/DL
HGB BLD-MCNC: 12.5 G/DL (ref 11.7–15.7)
LDLC SERPL CALC-MCNC: 92 MG/DL
MICROALBUMIN UR-MCNC: 197 MG/L
MICROALBUMIN/CREAT UR: 102.07 MG/G CR (ref 0–25)
NONHDLC SERPL-MCNC: 114 MG/DL
POTASSIUM SERPL-SCNC: 4.6 MMOL/L (ref 3.4–5.3)
SODIUM SERPL-SCNC: 138 MMOL/L (ref 135–145)
TRIGL SERPL-MCNC: 110 MG/DL

## 2023-11-30 PROCEDURE — 80061 LIPID PANEL: CPT | Performed by: NURSE PRACTITIONER

## 2023-11-30 PROCEDURE — 82570 ASSAY OF URINE CREATININE: CPT | Performed by: NURSE PRACTITIONER

## 2023-11-30 PROCEDURE — 99214 OFFICE O/P EST MOD 30 MIN: CPT | Mod: 25 | Performed by: NURSE PRACTITIONER

## 2023-11-30 PROCEDURE — 36415 COLL VENOUS BLD VENIPUNCTURE: CPT | Performed by: NURSE PRACTITIONER

## 2023-11-30 PROCEDURE — 90471 IMMUNIZATION ADMIN: CPT | Performed by: NURSE PRACTITIONER

## 2023-11-30 PROCEDURE — 80048 BASIC METABOLIC PNL TOTAL CA: CPT | Performed by: NURSE PRACTITIONER

## 2023-11-30 PROCEDURE — 90746 HEPB VACCINE 3 DOSE ADULT IM: CPT | Performed by: NURSE PRACTITIONER

## 2023-11-30 PROCEDURE — 99396 PREV VISIT EST AGE 40-64: CPT | Mod: 25 | Performed by: NURSE PRACTITIONER

## 2023-11-30 PROCEDURE — 90677 PCV20 VACCINE IM: CPT | Performed by: NURSE PRACTITIONER

## 2023-11-30 PROCEDURE — 90472 IMMUNIZATION ADMIN EACH ADD: CPT | Performed by: NURSE PRACTITIONER

## 2023-11-30 PROCEDURE — 83036 HEMOGLOBIN GLYCOSYLATED A1C: CPT | Performed by: NURSE PRACTITIONER

## 2023-11-30 PROCEDURE — 86803 HEPATITIS C AB TEST: CPT | Performed by: NURSE PRACTITIONER

## 2023-11-30 PROCEDURE — 83001 ASSAY OF GONADOTROPIN (FSH): CPT | Performed by: NURSE PRACTITIONER

## 2023-11-30 PROCEDURE — 82043 UR ALBUMIN QUANTITATIVE: CPT | Performed by: NURSE PRACTITIONER

## 2023-11-30 PROCEDURE — 87389 HIV-1 AG W/HIV-1&-2 AB AG IA: CPT | Performed by: NURSE PRACTITIONER

## 2023-11-30 PROCEDURE — 85018 HEMOGLOBIN: CPT | Performed by: NURSE PRACTITIONER

## 2023-11-30 RX ORDER — METFORMIN HCL 500 MG
1000 TABLET, EXTENDED RELEASE 24 HR ORAL 2 TIMES DAILY WITH MEALS
Qty: 360 TABLET | Refills: 3 | Status: SHIPPED | OUTPATIENT
Start: 2023-11-30

## 2023-11-30 RX ORDER — FERROUS SULFATE 325(65) MG
325 TABLET ORAL
COMMUNITY
End: 2023-11-30

## 2023-11-30 RX ORDER — LANCETS 33 GAUGE
EACH MISCELLANEOUS
COMMUNITY
Start: 2023-07-11 | End: 2024-07-12

## 2023-11-30 ASSESSMENT — ENCOUNTER SYMPTOMS
CONSTIPATION: 0
ARTHRALGIAS: 1
SHORTNESS OF BREATH: 0
FREQUENCY: 0
JOINT SWELLING: 0
HEMATURIA: 0
SORE THROAT: 0
NAUSEA: 0
FEVER: 0
DIARRHEA: 0
HEARTBURN: 1
EYE PAIN: 0
BREAST MASS: 0
DIZZINESS: 0
PARESTHESIAS: 0
CHILLS: 0
WEAKNESS: 0
PALPITATIONS: 0
HEADACHES: 0
MYALGIAS: 0
HEMATOCHEZIA: 0
COUGH: 0
ABDOMINAL PAIN: 0
NERVOUS/ANXIOUS: 0
DYSURIA: 0

## 2023-11-30 ASSESSMENT — PAIN SCALES - GENERAL: PAINLEVEL: NO PAIN (1)

## 2023-11-30 NOTE — PROGRESS NOTES
SUBJECTIVE:   Rachele is a 53 year old, presenting for the following:  Physical        11/30/2023     7:59 AM   Additional Questions   Roomed by kyle   Accompanied by self       Healthy Habits:     Getting at least 3 servings of Calcium per day:  Yes    Bi-annual eye exam:  Yes    Dental care twice a year:  Yes    Sleep apnea or symptoms of sleep apnea:  None    Diet:  Regular (no restrictions)    Frequency of exercise:  2-3 days/week    Duration of exercise:  15-30 minutes    Taking medications regularly:  Yes    Barriers to taking medications:  None    Medication side effects:  Not applicable    Additional concerns today:  Yes          Via the Health Maintenance questionnaire, the patient has reported the following services have been completed -Colonscopy, this information has been sent to the abstraction team.        Diabetes Follow-up    How often are you checking your blood sugar? One time daily  What time of day are you checking your blood sugars (select all that apply)?  Before meals  Have you had any blood sugars above 200?  No  Have you had any blood sugars below 70?  No  What symptoms do you notice when your blood sugar is low?  Shaky  What concerns do you have today about your diabetes? None   Do you have any of these symptoms? (Select all that apply)  No numbness or tingling in feet.  No redness, sores or blisters on feet.  No complaints of excessive thirst.  No reports of blurry vision.  No significant changes to weight.      BP Readings from Last 2 Encounters:   11/30/23 133/89   08/29/23 121/82     Hemoglobin A1C (%)   Date Value   11/30/2023 7.0 (H)             Hyperlipidemia Follow-Up    Are you regularly taking any medication or supplement to lower your cholesterol?   No  Are you having muscle aches or other side effects that you think could be caused by your cholesterol lowering medication?    Have you ever done Advance Care Planning? (For example, a Health Directive, POLST, or a discussion  with a medical provider or your loved ones about your wishes): No, advance care planning information given to patient to review.  Patient declined advance care planning discussion at this time.    Social History     Tobacco Use    Smoking status: Never    Smokeless tobacco: Never   Substance Use Topics    Alcohol use: Yes     Comment: jefferson             11/27/2023     5:32 PM   Alcohol Use   Prescreen: >3 drinks/day or >7 drinks/week? No     Reviewed orders with patient.  Reviewed health maintenance and updated orders accordingly - Yes  Labs reviewed in EPIC  BP Readings from Last 3 Encounters:   11/30/23 133/89   08/29/23 121/82   02/24/23 137/83    Wt Readings from Last 3 Encounters:   11/30/23 132 kg (291 lb)   02/24/23 131.8 kg (290 lb 9.6 oz)   01/22/13 122 kg (269 lb)                  Patient Active Problem List   Diagnosis    NO ACTIVE PROBLEMS    CARDIOVASCULAR SCREENING; LDL GOAL LESS THAN 160    Morbid obesity (H)    Type 2 diabetes mellitus without complication (H)     Past Surgical History:   Procedure Laterality Date    CHOLECYSTECTOMY, LAPOROSCOPIC      Cholecystectomy, Laparoscopic    COLONOSCOPY N/A 8/29/2023    Procedure: COLONOSCOPY, FLEXIBLE, WITH LESION REMOVAL USING SNARE;  Surgeon: Elias Arias, ;  Location: [facility] OR    COLONOSCOPY WITH CO2 INSUFFLATION N/A 8/29/2023    Procedure: Colonoscopy with CO2 insufflation;  Surgeon: Elias Arias DO;  Location: [facility] OR    D & C      LEEP TX, CERVICAL      LEEP TX Cervical    [place] TRABECULOPLASTY BY LASER SURGERY Bilateral 2022    [place] [facility] - due to narrow angle    SURGICAL HISTORY OF -       back    SURGICAL HISTORY OF -  Left 2004    tear duct       Social History     Tobacco Use    Smoking status: Never    Smokeless tobacco: Never   Substance Use Topics    Alcohol use: Yes     Comment: occas     Family History   Problem Relation Age of Onset    Hypertension Father     Hyperlipidemia Father     C.A.D. Father     Diabetes Father      Coronary Artery Disease Father     Breast Cancer Sister     Other Cancer Sister         Diagnosed with sarcoma cancer in May. 2023 cervical cancer  2023 at age 68    Breast Cancer Sister     Cancer Brother 67        cholangiocarcinoma    Other Cancer Brother         Diagnosed with cholangiocarcinoma and  23 age 67    Circulatory Maternal Grandfather     RAMONE. Paternal Grandmother     RAMONE. Paternal Grandfather     Lung Cancer Paternal Grandfather     Anxiety Disorder Son     Glaucoma No family hx of     Macular Degeneration No family hx of     Colon Cancer No family hx of     Depression No family hx of     Osteoporosis No family hx of     Thyroid Disease No family hx of            Breast Cancer Screening:    FHS-7:       10/11/2023     3:17 PM 2023     5:35 PM   Breast CA Risk Assessment (FHS-7)   Did any of your first-degree relatives have breast or ovarian cancer? Yes Yes   Did any of your relatives have bilateral breast cancer? No Unknown   Did any man in your family have breast cancer? No No   Did any woman in your family have breast and ovarian cancer? No Yes   Did any woman in your family have breast cancer before age 50 y? Yes Yes   Do you have 2 or more relatives with breast and/or ovarian cancer? No Yes   Do you have 2 or more relatives with breast and/or bowel cancer? No Yes   Mammogram Screening: Recommended annual mammography  Pertinent mammograms are reviewed under the imaging tab.    History of abnormal Pap smear: Last 3 Pap and HPV Results:     [place] , LEEP_ unsure of date.   Last pap  21 NIL, negative HPV  3/10/2018 NIL, negative HPV    Reviewed and updated as needed this visit by clinical staff    Allergies  Meds              Reviewed and updated as needed this visit by Provider                 Past Medical History:   Diagnosis Date    Diabetes (H)     NO ACTIVE PROBLEMS       Past Surgical History:   Procedure Laterality Date    CHOLECYSTECTOMY, LAPOROSCOPIC       Cholecystectomy, Laparoscopic    COLONOSCOPY N/A 8/29/2023    Procedure: COLONOSCOPY, FLEXIBLE, WITH LESION REMOVAL USING SNARE;  Surgeon: Elias Arias, DO;  Location: [facility]    COLONOSCOPY WITH CO2 INSUFFLATION N/A 8/29/2023    Procedure: Colonoscopy with CO2 insufflation;  Surgeon: Elias Arias, DO;  Location: [facility]    D & C      LEEP TX, CERVICAL      LEEP TX Cervical    [place] TRABECULOPLASTY BY LASER SURGERY Bilateral 2022    [place] [facility] - due to narrow angle    SURGICAL HISTORY OF -       back    SURGICAL HISTORY OF -  Left 2004    tear duct       Review of Systems   Constitutional:  Negative for chills and fever.   HENT:  Negative for congestion, ear pain, hearing loss and sore throat.    Eyes:  Negative for pain and visual disturbance.   Respiratory:  Negative for cough and shortness of breath.    Cardiovascular:  Positive for peripheral edema. Negative for chest pain and palpitations.   Gastrointestinal:  Positive for heartburn. Negative for abdominal pain, constipation, diarrhea, hematochezia and nausea.   Breasts:  Negative for tenderness, breast mass and discharge.   Genitourinary:  Negative for dysuria, frequency, genital sores, hematuria, pelvic pain, urgency, vaginal bleeding and vaginal discharge.   Musculoskeletal:  Positive for arthralgias. Negative for joint swelling and myalgias.   Skin:  Negative for rash.   Neurological:  Negative for dizziness, weakness, headaches and paresthesias.   Psychiatric/Behavioral:  Negative for mood changes. The patient is not nervous/anxious.           OBJECTIVE:   There were no vitals taken for this visit.  Physical Exam  GENERAL: healthy, alert and no distress  EYES: Eyes grossly normal to inspection, PERRL and conjunctivae and sclerae normal  HENT: ear canals and TM's normal, nose and mouth without ulcers or lesions  NECK: no adenopathy, no asymmetry, masses, or scars and thyroid normal to palpation  RESP: lungs clear to auscultation - no rales,  rhonchi or wheezes  CV: regular rate and rhythm, normal S1 S2, no S3 or S4, no murmur, click or rub, no peripheral edema and peripheral pulses strong  ABDOMEN: soft, nontender, no hepatosplenomegaly, no masses and bowel sounds normal   (female): deferred  MS: no gross musculoskeletal defects noted, no edema  SKIN: no suspicious lesions or rashes  NEURO: Normal strength and tone, mentation intact and speech normal  PSYCH: mentation appears normal, affect normal/bright  LYMPH: normal ant/post cervical, supraclavicular nodes  inguinal: no adenopathy    Diagnostic Test Results:  Labs reviewed in Epic  Results for orders placed or performed in visit on 11/30/23 (from the past 24 hour(s))   Hemoglobin A1c   Result Value Ref Range    Hemoglobin A1C 7.0 (H) 0.0 - 5.6 %   Hemoglobin   Result Value Ref Range    Hemoglobin 12.5 11.7 - 15.7 g/dL       ASSESSMENT/PLAN:   (Z00.00) Routine history and physical examination of adult  (primary encounter diagnosis)  Comment:   Plan:     (E11.9) Type 2 diabetes mellitus without complication, without long-term current use of insulin (H)  Comment: A1c 7.0. We discussed starting GLP-1 Agonist for weight loss improved glycemic control- she'll think about this for now. Also encouraged CDE referral but she is not interested in this feels her diet is good and that she needs to improve her activity level. Discussed goal of 150 min moderately strenuous exercise/week along with strength training.  Plan: FOOT EXAM, Albumin Random Urine Quantitative         with Creat Ratio, Hemoglobin A1c, Basic         metabolic panel  (Ca, Cl, CO2, Creat, Gluc, K,         Na, BUN), Hemoglobin, metFORMIN (GLUCOPHAGE XR)        500 MG 24 hr tablet, Home Blood Pressure         Monitor Order for DME - ONLY FOR DME            (E66.01) Morbid obesity (H)  Comment: See above.  Plan: work on getting more regular exercise, increase water consumption, discussed healthy snacking. She declined CDE referral    (N95.1)  "Perimenopause  Comment: checking FSH  Plan: Follicle stimulating hormone            (E78.5) Hyperlipidemia LDL goal <100  Comment: checkign lipid, not on statin and encouraged this.  Plan: Lipid panel reflex to direct LDL Fasting            (Z11.4) Screening for HIV (human immunodeficiency virus)  Comment:   Plan: HIV Antigen Antibody Combo            (Z11.59) Need for hepatitis C screening test  Comment:   Plan: Hepatitis C Screen Reflex to HCV RNA Quant and         Genotype            Patient has been advised of split billing requirements and indicates understanding: Yes      COUNSELING:  Reviewed preventive health counseling, as reflected in patient instructions      BMI:   Estimated body mass index is 49.86 kg/m  as calculated from the following:    Height as of 2/24/23: 1.626 m (5' 4.02\").    Weight as of 2/24/23: 131.8 kg (290 lb 9.6 oz).   Weight management plan: Discussed healthy diet and exercise guidelines      She reports that she has never smoked. She has never used smokeless tobacco.          BELKYS Anderson CNP  [facility]  "

## 2023-11-30 NOTE — NURSING NOTE
Prior to immunization administration, verified patients identity using patient s name and date of birth. Please see Immunization Activity for additional information.     Screening Questionnaire for Adult Immunization    Are you sick today?   No   Do you have allergies to medications, food, a vaccine component or latex?   No   Have you ever had a serious reaction after receiving a vaccination?   No   Do you have a long-term health problem with heart, lung, kidney, or metabolic disease (e.g., diabetes), asthma, a blood disorder, no spleen, complement component deficiency, a cochlear implant, or a spinal fluid leak?  Are you on long-term aspirin therapy?   Yes   Do you have cancer, leukemia, HIV/AIDS, or any other immune system problem?   No   Do you have a parent, brother, or sister with an immune system problem?   No   In the past 3 months, have you taken medications that affect  your immune system, such as prednisone, other steroids, or anticancer drugs; drugs for the treatment of rheumatoid arthritis, Crohn s disease, or psoriasis; or have you had radiation treatments?   No   Have you had a seizure, or a brain or other nervous system problem?   No   During the past year, have you received a transfusion of blood or blood    products, or been given immune (gamma) globulin or antiviral drug?   No   For women: Are you pregnant or is there a chance you could become       pregnant during the next month?   No   Have you received any vaccinations in the past 4 weeks?   No     Immunization questionnaire was positive for at least one answer.  Notified Jacqui Cabezas .      Patient instructed to remain in clinic for 15 minutes afterwards, and to report any adverse reactions.     Screening performed by Zora De Jesus MA on 11/30/2023 at 9:19 AM.

## 2023-12-01 LAB — HIV 1+2 AB+HIV1 P24 AG SERPL QL IA: NONREACTIVE

## 2023-12-04 ENCOUNTER — MYC MEDICAL ADVICE (OUTPATIENT)
Dept: FAMILY MEDICINE | Facility: CLINIC | Age: 53
End: 2023-12-04
Payer: COMMERCIAL

## 2023-12-04 DIAGNOSIS — E11.9 TYPE 2 DIABETES MELLITUS WITHOUT COMPLICATION, WITHOUT LONG-TERM CURRENT USE OF INSULIN (H): ICD-10-CM

## 2023-12-04 RX ORDER — METFORMIN HCL 500 MG
1000 TABLET, EXTENDED RELEASE 24 HR ORAL 2 TIMES DAILY WITH MEALS
Qty: 360 TABLET | Refills: 3 | Status: CANCELLED | OUTPATIENT
Start: 2023-12-04

## 2023-12-04 RX ORDER — LOSARTAN POTASSIUM 25 MG/1
25 TABLET ORAL DAILY
Qty: 90 TABLET | Refills: 3 | Status: SHIPPED | OUTPATIENT
Start: 2023-12-04

## 2023-12-04 NOTE — TELEPHONE ENCOUNTER
See [facility] message below.     Medications pended. Routing to refill pool.     Shani Villatoro, RN, BSN, PHN  [facility]  Nurse Triage, Family Practice

## 2024-04-21 ENCOUNTER — HEALTH MAINTENANCE LETTER (OUTPATIENT)
Age: 54
End: 2024-04-21

## 2024-05-15 ENCOUNTER — OFFICE VISIT (OUTPATIENT)
Dept: OPTOMETRY | Facility: CLINIC | Age: 54
End: 2024-05-15
Payer: COMMERCIAL

## 2024-05-15 DIAGNOSIS — H52.222 REGULAR ASTIGMATISM OF LEFT EYE: ICD-10-CM

## 2024-05-15 DIAGNOSIS — H52.03 HYPEROPIA, BILATERAL: ICD-10-CM

## 2024-05-15 DIAGNOSIS — E11.9 TYPE 2 DIABETES MELLITUS WITHOUT COMPLICATION, WITHOUT LONG-TERM CURRENT USE OF INSULIN (H): Primary | ICD-10-CM

## 2024-05-15 DIAGNOSIS — H40.039 ANATOMICAL NARROW ANGLE: ICD-10-CM

## 2024-05-15 DIAGNOSIS — H52.4 PRESBYOPIA: ICD-10-CM

## 2024-05-15 PROCEDURE — 92014 COMPRE OPH EXAM EST PT 1/>: CPT | Performed by: OPTOMETRIST

## 2024-05-15 PROCEDURE — 92015 DETERMINE REFRACTIVE STATE: CPT | Performed by: OPTOMETRIST

## 2024-05-15 ASSESSMENT — CONF VISUAL FIELD
OS_SUPERIOR_NASAL_RESTRICTION: 0
OD_INFERIOR_NASAL_RESTRICTION: 0
OD_SUPERIOR_TEMPORAL_RESTRICTION: 0
OS_INFERIOR_TEMPORAL_RESTRICTION: 0
OS_INFERIOR_NASAL_RESTRICTION: 0
OD_SUPERIOR_NASAL_RESTRICTION: 0
OS_NORMAL: 1
OD_INFERIOR_TEMPORAL_RESTRICTION: 0
OD_NORMAL: 1
OS_SUPERIOR_TEMPORAL_RESTRICTION: 0
METHOD: COUNTING FINGERS

## 2024-05-15 ASSESSMENT — VISUAL ACUITY
METHOD: SNELLEN - LINEAR
OD_SC: 20/50
OS_PH_SC: 20/40
OS_SC: 20/50
OD_SC: 20/30
OD_SC+: -1
OS_SC+: -1
OS_SC: 20/70+1
OS_PH_SC+: -2

## 2024-05-15 ASSESSMENT — KERATOMETRY
OD_K1POWER_DIOPTERS: 43.00
OD_K2POWER_DIOPTERS: 44.00
OS_AXISANGLE2_DEGREES: 170
OS_K1POWER_DIOPTERS: 43.00
OD_AXISANGLE2_DEGREES: 176
OS_K2POWER_DIOPTERS: 43.75

## 2024-05-15 ASSESSMENT — REFRACTION_MANIFEST
OD_SPHERE: +1.75
OD_CYLINDER: SPHERE
OS_CYLINDER: +1.25
OS_SPHERE: +3.50
METHOD_AUTOREFRACTION: 1
OS_AXIS: 160
OS_SPHERE: +3.50
OD_ADD: +1.75
OS_ADD: +1.75
OD_AXIS: 009
OD_CYLINDER: +0.25
OS_CYLINDER: +0.75
OD_SPHERE: +1.50
OS_AXIS: 164

## 2024-05-15 ASSESSMENT — REFRACTION_WEARINGRX
OS_SPHERE: +3.00
OD_ADD: +1.50
OD_SPHERE: +1.75
OS_ADD: +1.50
OS_CYLINDER: +0.50
OD_CYLINDER: SPHERE
OS_AXIS: 165

## 2024-05-15 ASSESSMENT — TONOMETRY
OS_IOP_MMHG: 18
OD_IOP_MMHG: 18
IOP_METHOD: APPLANATION

## 2024-05-15 ASSESSMENT — CUP TO DISC RATIO
OS_RATIO: 0.2
OD_RATIO: 0.2

## 2024-05-15 ASSESSMENT — EXTERNAL EXAM - LEFT EYE: OS_EXAM: NORMAL

## 2024-05-15 ASSESSMENT — SLIT LAMP EXAM - LIDS
COMMENTS: NORMAL
COMMENTS: NORMAL

## 2024-05-15 ASSESSMENT — EXTERNAL EXAM - RIGHT EYE: OD_EXAM: NORMAL

## 2024-05-15 NOTE — LETTER
5/15/2024         RE: Rachele Marquis  [address] [facility] [address]        Dear Colleague,    Thank you for referring your patient, Rachele Marquis, to the [facility]. No diabetic retinopathy was found at eye exam. Please see a copy of my visit note below.    Chief Complaint   Patient presents with     Diabetic Eye Exam        Chief Complaint(s) and History of Present Illness(es)       Diabetic Eye Exam              Vision: is worsening    Diabetes Type: Type 2 and taking oral medications    Blood Sugars: is controlled                   Lab Results   Component Value Date    A1C 7.0 11/30/2023            Last Eye Exam: 3/8/23  Dilated Previously: Yes    What are you currently using to see?  readers and computer glasses, She did not bring them today. The OTC's are +1.25's to wander around, and +2.00's for reading     Distance Vision Acuity: Satisfied with vision    Near Vision Acuity: Satisfied with vision while reading  with readers and Satisfied with vision while using computer with readers and with computer glasses    Eye Comfort: good and itchy at times   Do you use eye drops? : No  Occupation or Hobbies:      [employer]      Medical, surgical and family histories reviewed and updated 5/15/2024.      YAG laser peripheral  iridotomy  OU 2022     Father -glaucoma  No family history of macular degeneration     OBJECTIVE: See Ophthalmology exam    ASSESSMENT:    ICD-10-CM    1. Type 2 diabetes mellitus without complication, without long-term current use of insulin (H)  E11.9 EYE EXAM (SIMPLE-NONBILLABLE)     REFRACTION      2. Anatomical narrow angle  H40.039 EYE EXAM (SIMPLE-NONBILLABLE)     REFRACTION      3. Hyperopia, bilateral  H52.03 EYE EXAM (SIMPLE-NONBILLABLE)     REFRACTION      4. Regular astigmatism of left eye  H52.222 EYE EXAM (SIMPLE-NONBILLABLE)     REFRACTION      5. Presbyopia  H52.4 EYE EXAM (SIMPLE-NONBILLABLE)      REFRACTION          PLAN:    Rachele Marquis aware  eye exam results will be sent to Idalia Cabezas  Patient Instructions   Fill glasses prescription  Allow 2 weeks to adapt to change in glasses  Keep blood sugar under good control  Return in 1 year for diabetic eye exam      Blood sugar and blood pressure control are very important in the prevention of ocular complications from diabetes.       Jaclyn Valencia, OD  [phone]                      Again, thank you for allowing me to participate in the care of your patient.        Sincerely,        Jaclyn Valencia, OD

## 2024-05-15 NOTE — PATIENT INSTRUCTIONS
Fill glasses prescription  Allow 2 weeks to adapt to change in glasses  Keep blood sugar under good control  Return in 1 year for diabetic eye exam      Blood sugar and blood pressure control are very important in the prevention of ocular complications from diabetes.       Jaclyn Valencia, OD  [phone]

## 2024-05-15 NOTE — PROGRESS NOTES
Chief Complaint   Patient presents with    Diabetic Eye Exam        Chief Complaint(s) and History of Present Illness(es)       Diabetic Eye Exam              Vision: is worsening    Diabetes Type: Type 2 and taking oral medications    Blood Sugars: is controlled                   Lab Results   Component Value Date    A1C 7.0 11/30/2023            Last Eye Exam: 3/8/23  Dilated Previously: Yes    What are you currently using to see?  readers and computer glasses, She did not bring them today. The OTC's are +1.25's to wander around, and +2.00's for reading     Distance Vision Acuity: Satisfied with vision    Near Vision Acuity: Satisfied with vision while reading  with readers and Satisfied with vision while using computer with readers and with computer glasses    Eye Comfort: good and itchy at times   Do you use eye drops? : No  Occupation or Hobbies:      [employer]      Medical, surgical and family histories reviewed and updated 5/15/2024.      YAG laser peripheral  iridotomy  OU 2022     Father -glaucoma  No family history of macular degeneration     OBJECTIVE: See Ophthalmology exam    ASSESSMENT:    ICD-10-CM    1. Type 2 diabetes mellitus without complication, without long-term current use of insulin (H)  E11.9 EYE EXAM (SIMPLE-NONBILLABLE)     REFRACTION      2. Anatomical narrow angle  H40.039 EYE EXAM (SIMPLE-NONBILLABLE)     REFRACTION      3. Hyperopia, bilateral  H52.03 EYE EXAM (SIMPLE-NONBILLABLE)     REFRACTION      4. Regular astigmatism of left eye  H52.222 EYE EXAM (SIMPLE-NONBILLABLE)     REFRACTION      5. Presbyopia  H52.4 EYE EXAM (SIMPLE-NONBILLABLE)     REFRACTION          PLAN:    Rachele Marquis aware  eye exam results will be sent to Idalia Cabezas  Patient Instructions   Fill glasses prescription  Allow 2 weeks to adapt to change in glasses  Keep blood sugar under good control  Return in 1 year for diabetic eye exam      Blood sugar and blood  pressure control are very important in the prevention of ocular complications from diabetes.       Jaclyn Valencia, OD  [phone]

## 2024-05-30 ENCOUNTER — VIRTUAL VISIT (OUTPATIENT)
Dept: FAMILY MEDICINE | Facility: CLINIC | Age: 54
End: 2024-05-30
Attending: NURSE PRACTITIONER
Payer: COMMERCIAL

## 2024-05-30 DIAGNOSIS — E66.01 MORBID OBESITY (H): ICD-10-CM

## 2024-05-30 DIAGNOSIS — Z23 NEED FOR HEPATITIS B VACCINATION: ICD-10-CM

## 2024-05-30 DIAGNOSIS — E78.5 HYPERLIPIDEMIA LDL GOAL <100: ICD-10-CM

## 2024-05-30 DIAGNOSIS — R12 HEARTBURN: ICD-10-CM

## 2024-05-30 DIAGNOSIS — E11.9 TYPE 2 DIABETES MELLITUS WITHOUT COMPLICATION, WITHOUT LONG-TERM CURRENT USE OF INSULIN (H): Primary | ICD-10-CM

## 2024-05-30 PROCEDURE — 99214 OFFICE O/P EST MOD 30 MIN: CPT | Mod: 95 | Performed by: NURSE PRACTITIONER

## 2024-05-30 PROCEDURE — G2211 COMPLEX E/M VISIT ADD ON: HCPCS | Mod: 95 | Performed by: NURSE PRACTITIONER

## 2024-05-30 NOTE — PROGRESS NOTES
"Rachele is a 53 year old who is being evaluated via a billable video visit.    How would you like to obtain your AVS? PayMins  If the video visit is dropped, the invitation should be resent by: Text to cell phone: [phone]  Will anyone else be joining your video visit? No          Instructions Relayed to Patient by Virtual Roomer:     Patient is active on [employer]:   Relayed following to patient: \"It looks like you are active on [employer], are you able to join the visit this way? If not, do you need us to send you a link now or would you like your provider to send a link via text or email when they are ready to initiate the visit?\"      Patient Confirmed they will join visit via: Text Link to Cell Phone  Reminded patient to ensure they were logged on to virtual visit by arrival time listed.   Asked if patient has flexibility to initiate visit sooner than arrival time: patient is unable to initiate visit earlier than arrival time     If pediatric virtual visit, ensured pediatric patient along with parent/guardian will be present for video visit.     Patient offered the website [email or web address] and/or phone number to [employer] Help line: [phone]     Assessment & Plan     Type 2 diabetes mellitus without complication, without long-term current use of insulin (H)  Due for labs, discussed possible GLP-1 agonist for improved glycemic control, referring to CDE for update and help  with managing her sweet tooth. Work on getting more regular exercise.  - Hemoglobin A1c  - Basic metabolic panel  (Ca, Cl, CO2, Creat, Gluc, K, Na, BUN)  - PRIMARY CARE FOLLOW-UP SCHEDULING    Morbid obesity (H)  Benefits of weight loss reviewed in detail, encouraged her to cut back on the carbohydrates in the diet, consume more fruits and vegetables, drink plenty of water, avoid fruit juices, sodas, get 150 min moderate exercise/week.  Recheck weight in 6 months.      Hyperlipidemia LDL goal <100  We discussed starting low " "dose statin give T2DM diagnosis- she'll think about this.    Heartburn  Checking labs, Ok to use OTC Pepcid after she's collected H pylori sample, reviewed dietary changes- cut back on the sweets, offending foods, get more regular exercise, avoid eating late at night, return to clinic if not improved, new, or worsening symptoms. .  - Helicobacter pylori Antigen Stool  - CBC with platelets    Need for hepatitis B vaccination  Future order placed.  - HEPATITIS B VACCINE (20 YEARS AND OLDER) (ENGERIX-B/RECOMBIVAX)        BMI  Estimated body mass index is 49.95 kg/m  as calculated from the following:    Height as of 11/30/23: 1.626 m (5' 4\").    Weight as of 11/30/23: 132 kg (291 lb).   Weight management plan: Discussed healthy diet and exercise guidelines      Work on weight loss  Regular exercise  See Patient Instructions    Nikki Jeffrey is a 53 year old, presenting for the following health issues:  Follow Up and Diabetes      5/30/2024     7:32 AM   Additional Questions   Roomed by Dyan         5/30/2024     7:32 AM   Patient Reported Additional Medications   Patient reports taking the following new medications none     History of Present Illness       Diabetes:   She presents for follow up of diabetes.  She is checking home blood glucose one time daily.   She checks blood glucose before meals.  Blood glucose is never over 200 and never under 70. She is aware of hypoglycemia symptoms including shakiness.    She has no concerns regarding her diabetes at this time.   She is not experiencing numbness or burning in feet, excessive thirst, blurry vision, weight changes or redness, sores or blisters on feet.           Am sugar 135-150. Does not check at other times.      Hyperlipidemia Follow-Up    Are you regularly taking any medication or supplement to lower your cholesterol?   No  Are you having muscle aches or other side effects that you think could be caused by your cholesterol lowering medication?  "     Component      Latest Ref Rng 11/30/2023  9:27 AM   Cholesterol      <200 mg/dL 185    Triglycerides      <150 mg/dL 110    HDL Cholesterol      >=50 mg/dL 71    LDL Cholesterol Calculated      <=100 mg/dL 92    Non HDL Cholesterol      <130 mg/dL 114        Heartburn- noted worse with consuming spicy, fatty foods, yeast breads and symptoms are worse when eating late and lying flat in bed.  She has symptoms several times in the week, takes Tums 3-4 days/week for symptoms, has had nausea and vomiting X 3 in the last 6 months due to heartburn symptoms which are completely resolved with emesis.    Morbid obesity- is not following a particular diet, exercises 2-3 days/week at the gym on bike and treadmill, does no significant strength training. She states her diet and portion sizes are good except that she has  a sweet tooth and consumes candy on a daily basis.          Objective           Vitals:  No vitals were obtained today due to virtual visit.    Physical Exam   GENERAL: alert and no distress  EYES: Eyes grossly normal to inspection.  No discharge or erythema, or obvious scleral/conjunctival abnormalities.  HENT: Normal cephalic/atraumatic.  External ears, nose and mouth without ulcers or lesions.  No nasal drainage visible.  NECK: No asymmetry, visible masses or scars  RESP: No audible wheeze, cough, or visible cyanosis.    SKIN: Visible skin clear. No significant rash, abnormal pigmentation or lesions.  NEURO: Cranial nerves grossly intact.  Mentation and speech appropriate for age.  PSYCH: Appropriate affect, tone, and pace of words    No results found for this or any previous visit (from the past 24 hour(s)).      Video-Visit Details    Type of service:  Video Visit   Originating Location (pt. Location): Home    Distant Location (provider location):  On-site  Platform used for Video Visit: Cecily  Signed Electronically by: BELKYS Anderson CNP

## 2024-06-20 ENCOUNTER — LAB (OUTPATIENT)
Dept: LAB | Facility: CLINIC | Age: 54
End: 2024-06-20
Payer: COMMERCIAL

## 2024-06-20 DIAGNOSIS — R12 HEARTBURN: ICD-10-CM

## 2024-06-20 DIAGNOSIS — E11.9 TYPE 2 DIABETES MELLITUS WITHOUT COMPLICATION, WITHOUT LONG-TERM CURRENT USE OF INSULIN (H): ICD-10-CM

## 2024-06-20 LAB
ANION GAP SERPL CALCULATED.3IONS-SCNC: 14 MMOL/L (ref 7–15)
BUN SERPL-MCNC: 12.2 MG/DL (ref 6–20)
CALCIUM SERPL-MCNC: 9.4 MG/DL (ref 8.6–10)
CHLORIDE SERPL-SCNC: 99 MMOL/L (ref 98–107)
CREAT SERPL-MCNC: 0.56 MG/DL (ref 0.51–0.95)
DEPRECATED HCO3 PLAS-SCNC: 24 MMOL/L (ref 22–29)
EGFRCR SERPLBLD CKD-EPI 2021: >90 ML/MIN/1.73M2
ERYTHROCYTE [DISTWIDTH] IN BLOOD BY AUTOMATED COUNT: 14.4 % (ref 10–15)
GLUCOSE SERPL-MCNC: 161 MG/DL (ref 70–99)
HBA1C MFR BLD: 7.3 % (ref 0–5.6)
HCT VFR BLD AUTO: 37.2 % (ref 35–47)
HGB BLD-MCNC: 11.9 G/DL (ref 11.7–15.7)
MCH RBC QN AUTO: 26 PG (ref 26.5–33)
MCHC RBC AUTO-ENTMCNC: 32 G/DL (ref 31.5–36.5)
MCV RBC AUTO: 81 FL (ref 78–100)
PLATELET # BLD AUTO: 298 10E3/UL (ref 150–450)
POTASSIUM SERPL-SCNC: 4.3 MMOL/L (ref 3.4–5.3)
RBC # BLD AUTO: 4.58 10E6/UL (ref 3.8–5.2)
SODIUM SERPL-SCNC: 137 MMOL/L (ref 135–145)
WBC # BLD AUTO: 6.7 10E3/UL (ref 4–11)

## 2024-06-20 PROCEDURE — 83036 HEMOGLOBIN GLYCOSYLATED A1C: CPT | Performed by: NURSE PRACTITIONER

## 2024-06-20 PROCEDURE — 36415 COLL VENOUS BLD VENIPUNCTURE: CPT

## 2024-06-20 PROCEDURE — 85027 COMPLETE CBC AUTOMATED: CPT

## 2024-06-20 PROCEDURE — 80048 BASIC METABOLIC PNL TOTAL CA: CPT

## 2024-07-12 DIAGNOSIS — E11.9 TYPE 2 DIABETES MELLITUS WITHOUT COMPLICATION, WITHOUT LONG-TERM CURRENT USE OF INSULIN (H): Primary | ICD-10-CM

## 2024-07-12 RX ORDER — LANCETS 33 GAUGE
EACH MISCELLANEOUS
Qty: 100 EACH | Refills: 3 | Status: SHIPPED | OUTPATIENT
Start: 2024-07-12

## 2024-09-19 ENCOUNTER — TELEPHONE (OUTPATIENT)
Dept: FAMILY MEDICINE | Facility: CLINIC | Age: 54
End: 2024-09-19
Payer: COMMERCIAL

## 2024-09-19 NOTE — TELEPHONE ENCOUNTER
Patient Quality Outreach    Patient is due for the following:   Diabetes -  A1C  Cervical Cancer Screening - PAP Needed      Topic Date Due    Hepatitis B Vaccine (2 of 3 - 19+ 3-dose series) 12/28/2023    Flu Vaccine (1) 09/01/2024    COVID-19 Vaccine (6 - 2024-25 season) 09/01/2024       Next Steps:   Schedule a office visit for pap and diabetes    Type of outreach:    [email or web address] message.      Questions for provider review:    None           Lorenza Singh

## 2024-10-09 ENCOUNTER — IMMUNIZATION (OUTPATIENT)
Dept: FAMILY MEDICINE | Facility: CLINIC | Age: 54
End: 2024-10-09
Payer: COMMERCIAL

## 2024-10-09 DIAGNOSIS — Z23 NEED FOR PROPHYLACTIC VACCINATION AND INOCULATION AGAINST INFLUENZA: Primary | ICD-10-CM

## 2024-10-09 DIAGNOSIS — Z23 HIGH PRIORITY FOR 2019-NCOV VACCINE: ICD-10-CM

## 2024-10-09 PROCEDURE — 91320 SARSCV2 VAC 30MCG TRS-SUC IM: CPT

## 2024-10-09 PROCEDURE — 90471 IMMUNIZATION ADMIN: CPT

## 2024-10-09 PROCEDURE — 90480 ADMN SARSCOV2 VAC 1/ONLY CMP: CPT

## 2024-10-09 PROCEDURE — 90673 RIV3 VACCINE NO PRESERV IM: CPT

## 2024-10-18 ENCOUNTER — ANCILLARY PROCEDURE (OUTPATIENT)
Dept: MAMMOGRAPHY | Facility: CLINIC | Age: 54
End: 2024-10-18
Attending: NURSE PRACTITIONER
Payer: COMMERCIAL

## 2024-10-18 DIAGNOSIS — Z12.31 VISIT FOR SCREENING MAMMOGRAM: ICD-10-CM

## 2024-10-18 PROCEDURE — 77063 BREAST TOMOSYNTHESIS BI: CPT

## 2024-10-22 NOTE — PATIENT INSTRUCTIONS
If you have labs or imaging done, the results will automatically release in [employer] without an interpretation.  Your health care professional will review those results and send an interpretation with recommendations as soon as possible, but this may be 1-3 business days.    If you have any questions regarding your visit, please contact your care team.     [facility] Access Services: [phone]  [facility] CLINIC HOURS TELEPHONE NUMBER   Riana Norman, ELLEN Wiseman-PAMELA Dejesus-PAMELA Osman-Surgery Scheduler  [place]-       Monday- [place]  8:00 am-4:00 pm    Tuesday- [place]  8:00 am-4:00 pm    Wednesday- [place] 8:00 am-4:00 pm    Thursday- [place] 8:00 am-4:00 pm    Friday- [place]  8:00 am-4:00 pm [facility]  [address] [place]  [place] [address]  PH: [phone]  Fax: [phone]    Imaging Scheduling all locations  PH: [phone]     [facility]  [address] [place] [address] Dr.  [place], [place] [address] [phone]    [facility]  [address] [place] [address]  PH: [phone]     **Surgeries** Our Surgery Schedulers will contact you to schedule. If you do not receive a call within 3 business days, please call [phone].  Urgent Care locations:  [facility]       Monday-Friday   10 am - 8 pm    Saturday and Sunday   9 am - 5 pm   [phone]   If you need a medication refill, please contact your pharmacy. Please allow 3 business days for your refill to be completed.  As always, Thank you for trusting us with your healthcare needs!

## 2024-10-24 ENCOUNTER — OFFICE VISIT (OUTPATIENT)
Dept: OBGYN | Facility: CLINIC | Age: 54
End: 2024-10-24
Payer: COMMERCIAL

## 2024-10-24 ENCOUNTER — PATIENT OUTREACH (OUTPATIENT)
Dept: ONCOLOGY | Facility: CLINIC | Age: 54
End: 2024-10-24

## 2024-10-24 VITALS
BODY MASS INDEX: 50.53 KG/M2 | SYSTOLIC BLOOD PRESSURE: 145 MMHG | DIASTOLIC BLOOD PRESSURE: 83 MMHG | OXYGEN SATURATION: 98 % | HEART RATE: 87 BPM | WEIGHT: 293 LBS

## 2024-10-24 DIAGNOSIS — Z80.0 FAMILY HISTORY OF COLON CANCER: ICD-10-CM

## 2024-10-24 DIAGNOSIS — Z12.4 CERVICAL CANCER SCREENING: Primary | ICD-10-CM

## 2024-10-24 DIAGNOSIS — Z80.3 FAMILY HISTORY OF MALIGNANT NEOPLASM OF BREAST: ICD-10-CM

## 2024-10-24 PROCEDURE — G0145 SCR C/V CYTO,THINLAYER,RESCR: HCPCS

## 2024-10-24 PROCEDURE — 87624 HPV HI-RISK TYP POOLED RSLT: CPT

## 2024-10-24 PROCEDURE — 99203 OFFICE O/P NEW LOW 30 MIN: CPT

## 2024-10-24 NOTE — PROGRESS NOTES
Well Woman Exam Note    SUBJECTIVE:  Rachele Marquis is a 54 year old female  who presents today for her well woman exam.    Concerns today include:     Family history of cancer: 3 of her siblings (out of 9 total) have  from cancer. 1 from uterine, 1 from breast, and 1 from colon. She is wanting to know if there is an alteration in her screening due to this.     Menstrual History:      2023     3:40 PM   Menstrual History   LAST MENSTRUAL PERIOD 10/28/2022     Sexual history: Declines STI testing    She has no bowel/bladder concerns today    Last pap: 2021 HPV neg  History of abnormal Pap smear: No - age 30- 64 PAP with HPV every 3 years recommended  Pap obtained today:  Yes    Mammogram current: yes  Last Mammogram: 10/18/2024 Cat 1: neg     Lipids: Managed by PCP    Diabetes Screening: Managed by PCP    Colonoscopy: Managed by PCP. Completed 2023.  (Recommended screening begins at age 45 years old)    Lung cancer screening: N/A  (Recommended yearly for people who: have a 20 pack-year or more smoking history AND smoke now or have quit within the past 15 years AND are between 50-80 years old)    DEXA scan: N/A  (Recommended screening begins at age 65 years old)    HISTORY:  Prescription Medications as of 10/24/2024         Rx Number Disp Refills Start End Last Dispensed Date Next Fill Date Owning Pharmacy    blood glucose (NO BRAND SPECIFIED) lancets standard  100 Lancet 1 2023 --   [facility] - [place], [place] - [address]    Sig: Use to test blood sugar 3 times daily or as directed.Please dispense one box.    Class: E-Prescribe    blood glucose (NO BRAND SPECIFIED) lancets standard  1 each 3 2023 --   [facility] - [place], [place] - [address]    Sig: Use to test blood sugar once daily or as directed.Please dispense 1 box    Class: E-Prescribe    blood glucose (NO BRAND SPECIFIED) test strip  100  strip 3 12/4/2023 --   [facility] - [place], [place] - [address]    Sig: Use to test blood sugar once daily or as directed.    Class: E-Prescribe    Coenzyme Q10 (CO Q 10) 100 MG CAPS  -- -- 10/18/2023 --       Class: Historical    Lancets (ONETOUCH DELICA PLUS [id number]) MISC  100 each 3 7/12/2024 --   [facility] - [address]    Sig: USE TO CHECK BLOOD SUGAR ONCE  DAILY OR AS DIRECTED    Class: E-Prescribe    Notes to Pharmacy: Please send a replace/new response with 90-Day Supply if appropriate to maximize member benefit. Requesting 1 year supply.    Renewals       Renewal provider: Idalia Cabezas APRN CNP            losartan (COZAAR) 25 MG tablet  90 tablet 3 12/4/2023 --   [facility] - [place], [place] - [address]    Sig: Take 1 tablet (25 mg) by mouth daily    Class: E-Prescribe    Route: Oral    metFORMIN (GLUCOPHAGE XR) 500 MG 24 hr tablet  360 tablet 3 11/30/2023 --   [facility] - [place], [place] - [address]    Sig: Take 2 tablets (1,000 mg) by mouth 2 times daily (with meals)    Class: E-Prescribe    Route: Oral    Multiple Vitamin (MULTI-VITAMIN) per tablet  -- --  --       Sig: Take 1 tablet by mouth daily.    Class: Historical    Route: Oral          Allergies   Allergen Reactions    Lisinopril Cough     Immunization History   Administered Date(s) Administered    COVID-19 12+ (Pfizer) 10/27/2023, 10/09/2024    COVID-19 Bivalent 12+ (Pfizer) 11/10/2022    COVID-19 Monovalent 18+ (Moderna) 04/07/2021, 05/11/2021, 12/14/2021    Hepatitis B, Adult 11/30/2023    Influenza (H1N1) 11/20/2009    Influenza (IIV3) PF 12/05/2005, 11/30/2006, 09/09/2009, 09/15/2009, 10/08/2012    Influenza Vaccine 18-64 (Flublok) 10/07/2021    Influenza Vaccine >6 months,quad, PF 11/26/2013, 11/28/2014, 12/05/2016, 09/25/2017, 10/01/2018, 10/07/2021, 10/27/2023    Influenza  Vaccine Trivalent (FluBlok) 10/09/2024    Influenza Vaccine, 6+MO IM (QUADRIVALENT W/PRESERVATIVES) 10/23/2019    Influenza,INJ,MDCK,PF,Quad >6mo(Flucelvax) 2018, 11/10/2022    Pneumococcal 20 valent Conjugate (Prevnar 20) 2023    TDAP (Adacel,Boostrix) 10/06/2009, 2020    Td (Adult), Adsorbed 2004    Zoster recombinant adjuvanted (SHINGRIX) 11/10/2022, 2023       OB History    Para Term  AB Living   4 2 2 0 2 2   SAB IAB Ectopic Multiple Live Births   1 0 0 0 2      # Outcome Date GA Lbr Michael/2nd Weight Sex Type Anes PTL Lv   4 Term 10/06/09   3.997 kg (8 lb 13 oz) M Vag-Spont   LENNIE   3 SAB            2 Term 05   4.245 kg (9 lb 5.8 oz) M Vag-Spont   LENNIE   1 AB               Past Medical History:   Diagnosis Date    Diabetes (H)     NO ACTIVE PROBLEMS      Past Surgical History:   Procedure Laterality Date    CHOLECYSTECTOMY, LAPOROSCOPIC      Cholecystectomy, Laparoscopic    COLONOSCOPY N/A 2023    Procedure: COLONOSCOPY, FLEXIBLE, WITH LESION REMOVAL USING SNARE;  Surgeon: Elias Arias DO;  Location: [facility] OR    COLONOSCOPY WITH CO2 INSUFFLATION N/A 2023    Procedure: Colonoscopy with CO2 insufflation;  Surgeon: Elias Arias DO;  Location: [facility] OR    D & C      LEEP TX, CERVICAL      LEEP TX Cervical    MI TRABECULOPLASTY BY LASER SURGERY Bilateral     [place] [facility] - due to narrow angle    SURGICAL HISTORY OF -       back    SURGICAL HISTORY OF -  Left     tear duct     Family History   Problem Relation Age of Onset    Glaucoma Father     Hypertension Father     Hyperlipidemia Father     C.A.D. Father     Diabetes Father     Coronary Artery Disease Father     Circulatory Maternal Grandfather     C.A.D. Paternal Grandmother     C.A.D. Paternal Grandfather     Lung Cancer Paternal Grandfather     Cancer Brother 67        cholangiocarcinoma    Other Cancer Brother         Diagnosed with cholangiocarcinoma and  23 age 67     Breast Cancer Sister     Other Cancer Sister         Diagnosed with sarcoma cancer in May. 2023 cervical cancer  2023 at age 68    Breast Cancer Sister     Colon Cancer Sister          2023    Anxiety Disorder Son     Macular Degeneration No family hx of     Depression No family hx of     Osteoporosis No family hx of     Thyroid Disease No family hx of      Social History     Socioeconomic History    Marital status:      Spouse name: None    Number of children: None    Years of education: None    Highest education level: None   Tobacco Use    Smoking status: Never    Smokeless tobacco: Never   Vaping Use    Vaping status: Never Used   Substance and Sexual Activity    Alcohol use: Yes     Comment: occas    Drug use: No    Sexual activity: Yes     Partners: Male     Birth control/protection: Post-menopausal     Social Drivers of Health     Financial Resource Strain: Low Risk  (2023)    Financial Resource Strain     Within the past 12 months, have you or your family members you live with been unable to get utilities (heat, electricity) when it was really needed?: No   Food Insecurity: Low Risk  (2023)    Food Insecurity     Within the past 12 months, did you worry that your food would run out before you got money to buy more?: No     Within the past 12 months, did the food you bought just not last and you didn t have money to get more?: No   Transportation Needs: Low Risk  (2023)    Transportation Needs     Within the past 12 months, has lack of transportation kept you from medical appointments, getting your medicines, non-medical meetings or appointments, work, or from getting things that you need?: No    [facility]    Social Connections   Interpersonal Safety: Low Risk  (2023)    Interpersonal Safety     Do you feel physically and emotionally safe where you currently live?:  Yes     Within the past 12 months, have you been hit, slapped, kicked or otherwise physically hurt by someone?: No     Within the past 12 months, have you been humiliated or emotionally abused in other ways by your partner or ex-partner?: No   Housing Stability: Low Risk  (11/27/2023)    Housing Stability     Do you have housing? : Yes     Are you worried about losing your housing?: No       REVIEW of SYSTEMS:  ROS: 10 point ROS neg other than the symptoms noted above in the HPI.     EXAM:  Blood pressure (!) 145/83, pulse 87, weight 133.5 kg (294 lb 6.4 oz), last menstrual period 10/28/2022, SpO2 98%, not currently breastfeeding. Body mass index is 50.53 kg/m .  General - pleasant female in no acute distress.  Skin - no suspicious lesions or rashes  EENT-  PERRLA, euthyroid with out palpable nodules  Neck - supple without lymphadenopathy.  Lungs - clear to auscultation bilaterally.  Heart - regular rate and rhythm without murmur.  Abdomen - soft, nontender, nondistended, no masses or organomegaly noted.  Musculoskeletal - no gross deformities.  Neurological - normal strength, sensation, and mental status.    Breast Exam:  Breasts: declined.     Pelvic Exam:  EG/BUS: Normal genital architecture without lesions, erythema or abnormal secretions Bartholin's, Urethra, Braden's normal   Urethral meatus: normal   Urethra: no masses, tenderness, or scarring   Bladder: no masses or tenderness   Vagina: atrophic, thin, dry with minimal secretions  Cervix: Multiparous, no lesions, and pink, moist, closed, without lesion or CMT  Uterus: anteverted  Rectum:anus normal     ASSESSMENT/PLAN:  (Z12.4) Cervical cancer screening  (primary encounter diagnosis)  Plan: HPV and Gynecologic Cytology Panel -         Recommended Age 30 - 65 Years          (Z80.0) Family history of colon cancer  Comment: We discussed following up with genetic counseling to further determine if increase monitoring is warranted.  Plan: Adult Genetics &  Metabolism  Referral          (Z80.3) Family history of malignant neoplasm of breast  Plan: Adult Genetics & Metabolism  Referral          We also discussed red flag symptoms, particularly for uterine cancer, and when/how to report.    35 minutes spent on the date of the encounter doing chart review, review of outside records, review of test results, patient visit, and documentation      BELKYS Trammell CNP

## 2024-10-24 NOTE — PROGRESS NOTES
Writer received referral to Cancer Risk Management/Genetic Counseling.    Referred for:   Family history of uterine cancer  Family history of colon cancer  Family history of malignant neoplasm of breast      Referred By    Provider Department Location Phone   Riana Norman, APRN CNP [facility] [phone]         Referral reviewed for appropriate plan, and sent to New Patient Scheduling ([phone]) for completion.    Snehal Wang, RN, BSN  Oncology New Patient Nurse Navigator   [facility]

## 2024-10-25 LAB
HPV HR 12 DNA CVX QL NAA+PROBE: NEGATIVE
HPV16 DNA CVX QL NAA+PROBE: NEGATIVE
HPV18 DNA CVX QL NAA+PROBE: NEGATIVE
HUMAN PAPILLOMA VIRUS FINAL DIAGNOSIS: NORMAL

## 2024-10-30 PROBLEM — R87.610 ASCUS OF CERVIX WITH NEGATIVE HIGH RISK HPV: Status: ACTIVE | Noted: 2017-01-09

## 2024-10-30 PROBLEM — R87.610 ASCUS OF CERVIX WITH NEGATIVE HIGH RISK HPV: Status: RESOLVED | Noted: 2017-01-09 | Resolved: 2024-10-30

## 2024-10-30 LAB
BKR AP ASSOCIATED HPV REPORT: NORMAL
BKR LAB AP GYN ADEQUACY: NORMAL
BKR LAB AP GYN INTERPRETATION: NORMAL
BKR LAB AP PREVIOUS ABNORMAL: NORMAL
PATH REPORT.COMMENTS IMP SPEC: NORMAL
PATH REPORT.COMMENTS IMP SPEC: NORMAL
PATH REPORT.RELEVANT HX SPEC: NORMAL

## 2024-10-31 ENCOUNTER — PATIENT OUTREACH (OUTPATIENT)
Dept: CARE COORDINATION | Facility: CLINIC | Age: 54
End: 2024-10-31
Payer: COMMERCIAL

## 2024-11-14 ENCOUNTER — PATIENT OUTREACH (OUTPATIENT)
Dept: CARE COORDINATION | Facility: CLINIC | Age: 54
End: 2024-11-14
Payer: COMMERCIAL

## 2024-11-16 ENCOUNTER — HEALTH MAINTENANCE LETTER (OUTPATIENT)
Age: 54
End: 2024-11-16

## 2024-12-31 DIAGNOSIS — E11.9 TYPE 2 DIABETES MELLITUS WITHOUT COMPLICATION, WITHOUT LONG-TERM CURRENT USE OF INSULIN (H): ICD-10-CM

## 2025-01-02 RX ORDER — METFORMIN HYDROCHLORIDE 500 MG/1
1000 TABLET, EXTENDED RELEASE ORAL 2 TIMES DAILY WITH MEALS
Qty: 120 TABLET | Refills: 0 | Status: SHIPPED | OUTPATIENT
Start: 2025-01-02

## 2025-01-02 NOTE — TELEPHONE ENCOUNTER
Called and spoke to the patient and gave her Jacqui's message. She will make this appt through her My Chart.  Claire Siegel MA  [facility]   Primary Care

## 2025-01-05 ENCOUNTER — HEALTH MAINTENANCE LETTER (OUTPATIENT)
Age: 55
End: 2025-01-05

## 2025-01-07 DIAGNOSIS — E11.9 TYPE 2 DIABETES MELLITUS WITHOUT COMPLICATION, WITHOUT LONG-TERM CURRENT USE OF INSULIN (H): ICD-10-CM

## 2025-01-13 RX ORDER — LOSARTAN POTASSIUM 25 MG/1
25 TABLET ORAL DAILY
Qty: 90 TABLET | Refills: 0 | Status: SHIPPED | OUTPATIENT
Start: 2025-01-13

## 2025-02-01 ENCOUNTER — VIRTUAL VISIT (OUTPATIENT)
Dept: URGENT CARE | Facility: CLINIC | Age: 55
End: 2025-02-01
Payer: COMMERCIAL

## 2025-02-01 DIAGNOSIS — E78.5 HYPERLIPIDEMIA LDL GOAL <100: ICD-10-CM

## 2025-02-01 DIAGNOSIS — U07.1 INFECTION DUE TO 2019 NOVEL CORONAVIRUS: Primary | ICD-10-CM

## 2025-02-01 DIAGNOSIS — E66.01 MORBID OBESITY (H): ICD-10-CM

## 2025-02-01 DIAGNOSIS — E11.9 TYPE 2 DIABETES MELLITUS WITHOUT COMPLICATION, UNSPECIFIED WHETHER LONG TERM INSULIN USE (H): ICD-10-CM

## 2025-02-01 PROCEDURE — 98014 SYNCH AUDIO-ONLY EST MOD 30: CPT

## 2025-02-01 NOTE — PROGRESS NOTES
"  Rachele Marquis is a 54 year old female who is being evaluated via a billable telephone visit.      The patient has been notified of following at the time patient scheduled visit:     \"This telephone visit will be conducted via a phone call between you and your physician/provider. We have found that certain health care needs can be provided without the need for a physical exam.  This service lets us provide the care you need with a phone conversation.  If a prescription is necessary we can send it directly to your pharmacy.  If lab work is needed we can place an order for that and you can then stop by our lab to have the test done at a later time.\"   Patient has given consent for telephone visit?  Yes    Reason for telephone visit: Patient did not consent to video    SUBJECTIVE:  Rachele Marquis is an 54 year old female who presents for covid.   started with sxs 4 days ago, hers started yesterday morning.  She has sore throat, cough, runny nose.  No fevers.  No n/v/d.      PMH:   has a past medical history of Diabetes (H) and NO ACTIVE PROBLEMS.  Patient Active Problem List   Diagnosis    Morbid obesity (H)    Type 2 diabetes mellitus without complication (H)    Hyperlipidemia LDL goal <100     Social History     Socioeconomic History    Marital status:    Tobacco Use    Smoking status: Never    Smokeless tobacco: Never   Vaping Use    Vaping status: Never Used   Substance and Sexual Activity    Alcohol use: Yes     Comment: occas    Drug use: No    Sexual activity: Yes     Partners: Male     Birth control/protection: Post-menopausal     Social Drivers of Health     Financial Resource Strain: Low Risk  (11/27/2023)    Financial Resource Strain     Within the past 12 months, have you or your family members you live with been unable to get utilities (heat, electricity) when it was really needed?: No   Food Insecurity: Low Risk  (11/27/2023)    Food Insecurity     Within the past 12 months, did " you worry that your food would run out before you got money to buy more?: No     Within the past 12 months, did the food you bought just not last and you didn t have money to get more?: No   Transportation Needs: Low Risk  (2023)    Transportation Needs     Within the past 12 months, has lack of transportation kept you from medical appointments, getting your medicines, non-medical meetings or appointments, work, or from getting things that you need?: No    Received from [facility]    Social Connections   Interpersonal Safety: Low Risk  (2023)    Interpersonal Safety     Do you feel physically and emotionally safe where you currently live?: Yes     Within the past 12 months, have you been hit, slapped, kicked or otherwise physically hurt by someone?: No     Within the past 12 months, have you been humiliated or emotionally abused in other ways by your partner or ex-partner?: No   Housing Stability: Low Risk  (2023)    Housing Stability     Do you have housing? : Yes     Are you worried about losing your housing?: No     Family History   Problem Relation Age of Onset    Glaucoma Father     Hypertension Father     Hyperlipidemia Father     C.A.D. Father     Diabetes Father     Coronary Artery Disease Father     Circulatory Maternal Grandfather     C.A.D. Paternal Grandmother     C.A.D. Paternal Grandfather     Lung Cancer Paternal Grandfather     Cancer Brother 67        cholangiocarcinoma    Other Cancer Brother         Diagnosed with cholangiocarcinoma and  23 age 67    Breast Cancer Sister     Other Cancer Sister         Diagnosed with sarcoma cancer in May. 2023 cervical cancer  2023 at age 68    Breast Cancer Sister     Colon Cancer Sister          2023    Anxiety Disorder Son     Macular Degeneration No family hx of     Depression No family hx of     Osteoporosis No family hx of     Thyroid  Disease No family hx of        ALLERGIES:  Lisinopril    Current Outpatient Medications   Medication Sig Dispense Refill    blood glucose (NO BRAND SPECIFIED) lancets standard Use to test blood sugar 3 times daily or as directed.Please dispense one box. 100 Lancet 1    blood glucose (NO BRAND SPECIFIED) lancets standard Use to test blood sugar once daily or as directed.Please dispense 1 box 1 each 3    blood glucose (ONETOUCH VERIO IQ) test strip USE TO CHECK BLOOD SUGAR ONCE  DAILY OR AS DIRECTED 100 strip 0    Coenzyme Q10 (CO Q 10) 100 MG CAPS       Lancets (ONETOUCH DELICA PLUS [id number]) MISC USE TO CHECK BLOOD SUGAR ONCE  DAILY OR AS DIRECTED 100 each 3    losartan (COZAAR) 25 MG tablet TAKE 1 TABLET BY MOUTH DAILY 90 tablet 0    metFORMIN (GLUCOPHAGE XR) 500 MG 24 hr tablet TAKE 2 TABLETS BY MOUTH TWICE  DAILY WITH MEALS 120 tablet 0    Multiple Vitamin (MULTI-VITAMIN) per tablet Take 1 tablet by mouth daily.       No current facility-administered medications for this visit.         ROS:  ROS is done and is negative for general/constitutional, eye, ENT, Respiratory, cardiovascular, GI, , Skin, musculoskeletal except as noted elsewhere.  All other review of systems negative except as noted elsewhere.      OBJECTIVE:    No vital signs taken as is virtual visit.  GENERAL : Alert and oriented.  No distress detected.    RESP: No respiratory distress detected during phone conversation           ASSESSMENT/PLAN:    ASSESSMENT / PLAN:  (U07.1) Infection due to 2019 novel coronavirus  (primary encounter diagnosis)  Comment: has risk factors so will tx with paxlovid  Plan: nirmatrelvir and ritonavir (PAXLOVID) 300         mg/100 mg therapy pack        Reviewed medication instructions and side effects. Follow up if experiences side effects.. I reviewed supportive care, otc meds to use if needed, expected course, and signs of concern.  Follow up as needed or if does not improve within 5 day(s) or if worsens in any way.   Reviewed red flag symptoms and is to go to the ER if experiences any of these.    (E11.9) Type 2 diabetes mellitus without complication, unspecified whether long term insulin use (H)  Comment: has hx of  Plan: monitor sugars while ill and notify pcp if too high or too low    (E66.01) Morbid obesity (H)  Comment: risk factor for covid complications.  Plan: treat covid as above    (E78.5) Hyperlipidemia LDL goal <100  Comment: hx of but not on any meds  Plan: treat covid as above.        See [facility] for orders, medications, letters, patient instructions    Goldie Cameron MD  2/1/2025, 7:51 AM      Phone call duration:  16 minutes    Originating Location (pt. Location): Home    Distant Location (provider location):  [facility]     Time spent: 30 minutes spent during visit, reviewing test results, chart review, and charting on day of encounter

## 2025-02-09 DIAGNOSIS — E78.5 HYPERLIPIDEMIA LDL GOAL <100: Primary | ICD-10-CM

## 2025-02-09 DIAGNOSIS — E11.9 TYPE 2 DIABETES MELLITUS WITHOUT COMPLICATION, WITHOUT LONG-TERM CURRENT USE OF INSULIN (H): ICD-10-CM

## 2025-02-10 RX ORDER — METFORMIN HYDROCHLORIDE 500 MG/1
1000 TABLET, EXTENDED RELEASE ORAL 2 TIMES DAILY WITH MEALS
Qty: 120 TABLET | Refills: 0 | Status: SHIPPED | OUTPATIENT
Start: 2025-02-10 | End: 2025-02-13

## 2025-02-13 ENCOUNTER — OFFICE VISIT (OUTPATIENT)
Dept: FAMILY MEDICINE | Facility: CLINIC | Age: 55
End: 2025-02-13
Payer: COMMERCIAL

## 2025-02-13 VITALS
TEMPERATURE: 97.3 F | RESPIRATION RATE: 20 BRPM | BODY MASS INDEX: 50.02 KG/M2 | WEIGHT: 293 LBS | HEART RATE: 83 BPM | OXYGEN SATURATION: 97 % | DIASTOLIC BLOOD PRESSURE: 85 MMHG | HEIGHT: 64 IN | SYSTOLIC BLOOD PRESSURE: 135 MMHG

## 2025-02-13 DIAGNOSIS — E78.5 HYPERLIPIDEMIA LDL GOAL <100: ICD-10-CM

## 2025-02-13 DIAGNOSIS — E11.9 TYPE 2 DIABETES MELLITUS WITHOUT COMPLICATION, UNSPECIFIED WHETHER LONG TERM INSULIN USE (H): ICD-10-CM

## 2025-02-13 DIAGNOSIS — Z23 NEED FOR HEPATITIS B VACCINATION: ICD-10-CM

## 2025-02-13 DIAGNOSIS — E66.01 MORBID OBESITY (H): ICD-10-CM

## 2025-02-13 DIAGNOSIS — Z00.00 ROUTINE HISTORY AND PHYSICAL EXAMINATION OF ADULT: Primary | ICD-10-CM

## 2025-02-13 LAB
EST. AVERAGE GLUCOSE BLD GHB EST-MCNC: 171 MG/DL
HBA1C MFR BLD: 7.6 % (ref 0–5.6)

## 2025-02-13 PROCEDURE — 82043 UR ALBUMIN QUANTITATIVE: CPT | Performed by: NURSE PRACTITIONER

## 2025-02-13 PROCEDURE — 83036 HEMOGLOBIN GLYCOSYLATED A1C: CPT | Performed by: NURSE PRACTITIONER

## 2025-02-13 PROCEDURE — 80061 LIPID PANEL: CPT | Performed by: NURSE PRACTITIONER

## 2025-02-13 PROCEDURE — 82570 ASSAY OF URINE CREATININE: CPT | Performed by: NURSE PRACTITIONER

## 2025-02-13 PROCEDURE — 36415 COLL VENOUS BLD VENIPUNCTURE: CPT | Performed by: NURSE PRACTITIONER

## 2025-02-13 RX ORDER — METFORMIN HYDROCHLORIDE 500 MG/1
1000 TABLET, EXTENDED RELEASE ORAL 2 TIMES DAILY WITH MEALS
Qty: 180 TABLET | Refills: 3 | Status: SHIPPED | OUTPATIENT
Start: 2025-02-13

## 2025-02-13 RX ORDER — LOSARTAN POTASSIUM 25 MG/1
25 TABLET ORAL DAILY
Qty: 90 TABLET | Refills: 3 | Status: SHIPPED | OUTPATIENT
Start: 2025-02-13

## 2025-02-13 RX ORDER — BLOOD SUGAR DIAGNOSTIC
STRIP MISCELLANEOUS
Qty: 100 STRIP | Refills: 3 | Status: SHIPPED | OUTPATIENT
Start: 2025-02-13

## 2025-02-13 RX ORDER — BLOOD SUGAR DIAGNOSTIC
STRIP MISCELLANEOUS
Qty: 100 STRIP | Refills: 0 | Status: SHIPPED | OUTPATIENT
Start: 2025-02-13 | End: 2025-02-13

## 2025-02-13 RX ORDER — TIRZEPATIDE 2.5 MG/.5ML
2.5 INJECTION, SOLUTION SUBCUTANEOUS
Qty: 2 ML | Refills: 0 | Status: SHIPPED | OUTPATIENT
Start: 2025-02-13

## 2025-02-13 ASSESSMENT — PAIN SCALES - GENERAL: PAINLEVEL_OUTOF10: NO PAIN (0)

## 2025-02-13 NOTE — PROGRESS NOTES
Assessment & Plan     Routine history and physical examination of adult  Appropriate preventive services were addressed with this patient via screening, questionnaire, or discussion as appropriate for fall prevention, nutrition, physical activity, tobacco-use cessation, social engagement, weight loss and cognition.  Checklist reviewing preventive services available has been given to the patient.      - PRIMARY CARE FOLLOW-UP SCHEDULING    Type 2 diabetes mellitus without complication, unspecified whether long term insulin use (H)  Starting Mounjaro for improved glycemic control and weight loss, reviewed dosing , potential side effects, need for adequat protein in the diet (strive for 320 gms protein with each meal) and need for regular exercise to keep from losing muscle while on Mounjaro. Follow back 1 month.  - Hemoglobin A1c  - Albumin Random Urine Quantitative with Creat Ratio  - FOOT EXAM  - losartan (COZAAR) 25 MG tablet  Dispense: 90 tablet; Refill: 3  - metFORMIN (GLUCOPHAGE XR) 500 MG 24 hr tablet  Dispense: 180 tablet; Refill: 3  - blood glucose (ONETOUCH VERIO IQ) test strip  Dispense: 100 strip; Refill: 3  - Hemoglobin A1c  - MOUNJARO 2.5 MG/0.5ML SOAJ  Dispense: 2 mL; Refill: 0    Morbid obesity (H)  See above, offered CDE referral but she declined for now  - MOUNJARO 2.5 MG/0.5ML SOAJ  Dispense: 2 mL; Refill: 0    Hyperlipidemia LDL goal <100  LDL above goal, starting Zocor 20 mg daily, recheck FLP in 3 months.  - Lipid panel reflex to direct LDL Fasting  - Lipid panel reflex to direct LDL Fasting  - Albumin Random Urine Quantitative with Creat Ratio  - simvastatin (ZOCOR) 20 MG tablet  Dispense: 90 tablet; Refill: 3  - Lipid panel reflex to direct LDL Fasting    Need for hepatitis B vaccination    - HEPATITIS B VACCINE (20 YEARS AND OLDER) (ENGERIX-B/RECOMBIVAX)            BMI  Estimated body mass index is 50.91 kg/m  as calculated from the following:    Height as of this encounter: 1.626 m (5'  "4\").    Weight as of this encounter: 134.5 kg (296 lb 9.6 oz).   Weight management plan: Discussed healthy diet and exercise guidelines      Work on weight loss  Regular exercise  See Patient Instructions    Nikki Jeffrey is a 54 year old, presenting for the following health issues:  Diabetes        2/13/2025     9:11 AM   Additional Questions   Roomed by afsaneh   Accompanied by self         2/13/2025     9:11 AM   Patient Reported Additional Medications   Patient reports taking the following new medications none     History of Present Illness       Diabetes:   She presents for follow up of diabetes.  She is checking home blood glucose one time daily.   She checks blood glucose before meals.  Blood glucose is never over 200 and never under 70. She is aware of hypoglycemia symptoms including shakiness.    She has no concerns regarding her diabetes at this time.   She is not experiencing numbness or burning in feet, excessive thirst, blurry vision, weight changes or redness, sores or blisters on feet.           She eats 2-3 servings of fruits and vegetables daily.She consumes 1 sweetened beverage(s) daily.She exercises with enough effort to increase her heart rate 10 to 19 minutes per day.    She is taking medications regularly.     Am sugars in the 150-160's, She had COVID 2 weeks ago, took Paxlovid and is feeling better although she continues to have  a loose, productive (clear) cough.     Weight loss- has had difficulty losing weight and is interested in weight loss medication.  She has tried multiple weight loss diets without improvement and exercises irregularly but states even she she exercised regularly, she didn't lose weight.  Ho personal/FH MEN-2 or medullary thyroid cancer.          Review of Systems  Constitutional, HEENT, cardiovascular, pulmonary, gi and gu systems are negative, except as otherwise noted.      Objective    /85 (BP Location: Left arm, Patient Position: Sitting, Cuff Size: Adult " "Large)   Pulse 83   Temp 97.3  F (36.3  C) (Temporal)   Resp 20   Ht 1.626 m (5' 4\")   Wt 134.5 kg (296 lb 9.6 oz)   LMP 10/28/2022 (Exact Date)   SpO2 97%   BMI 50.91 kg/m    Body mass index is 50.91 kg/m .  Physical Exam   GENERAL: alert and no distress  EYES: Eyes grossly normal to inspection, PERRL and conjunctivae and sclerae normal  HENT: ear canals and TM's normal, nose and mouth without ulcers or lesions  NECK: no adenopathy, no asymmetry, masses, or scars  RESP: lungs clear to auscultation - no rales, rhonchi or wheezes  CV: regular rate and rhythm, normal S1 S2, no S3 or S4, no murmur, click or rub, no peripheral edema  ABDOMEN: soft, nontender, no hepatosplenomegaly, no masses and bowel sounds normal  MS: no gross musculoskeletal defects noted, no edema  SKIN: no suspicious lesions or rashes  NEURO: Normal strength and tone, mentation intact and speech normal  PSYCH: mentation appears normal, affect normal/bright  LYMPH: normal ant/post cervical, supraclavicular nodes  inguinal: no adenopathy  Diabetic foot exam: normal DP and PT pulses, no trophic changes or ulcerative lesions, normal sensory exam, and normal monofilament exam    Results for orders placed or performed in visit on 02/13/25   Hemoglobin A1c     Status: Abnormal   Result Value Ref Range    Estimated Average Glucose 171 (H) <117 mg/dL    Hemoglobin A1C 7.6 (H) 0.0 - 5.6 %   Lipid panel reflex to direct LDL Fasting     Status: Abnormal   Result Value Ref Range    Cholesterol 194 <200 mg/dL    Triglycerides 100 <150 mg/dL    Direct Measure HDL 62 >=50 mg/dL    LDL Cholesterol Calculated 112 (H) <100 mg/dL    Non HDL Cholesterol 132 (H) <130 mg/dL    Patient Fasting > 8hrs? Yes     Narrative    Cholesterol  Desirable: < 200 mg/dL  Borderline High: 200 - 239 mg/dL  High: >= 240 mg/dL    Triglycerides  Normal: < 150 mg/dL  Borderline High: 150 - 199 mg/dL  High: 200-499 mg/dL  Very High: >= 500 mg/dL    Direct Measure HDL  Female: >= 50 " mg/dL   Male: >= 40 mg/dL    LDL Cholesterol  Desirable: < 100 mg/dL  Above Desirable: 100 - 129 mg/dL   Borderline High: 130 - 159 mg/dL   High:  160 - 189 mg/dL   Very High: >= 190 mg/dL    Non HDL Cholesterol  Desirable: < 130 mg/dL  Above Desirable: 130 - 159 mg/dL  Borderline High: 160 - 189 mg/dL  High: 190 - 219 mg/dL  Very High: >= 220 mg/dL   Albumin Random Urine Quantitative with Creat Ratio     Status: Abnormal   Result Value Ref Range    Creatinine Urine mg/dL 136.0 mg/dL    Albumin Urine mg/L 160.0 mg/L    Albumin Urine mg/g Cr 117.65 (H) 0.00 - 25.00 mg/g Cr           Signed Electronically by: BELKYS Anderson CNP

## 2025-02-14 LAB
CHOLEST SERPL-MCNC: 194 MG/DL
CREAT UR-MCNC: 136 MG/DL
FASTING STATUS PATIENT QL REPORTED: YES
HDLC SERPL-MCNC: 62 MG/DL
LDLC SERPL CALC-MCNC: 112 MG/DL
MICROALBUMIN UR-MCNC: 160 MG/L
MICROALBUMIN/CREAT UR: 117.65 MG/G CR (ref 0–25)
NONHDLC SERPL-MCNC: 132 MG/DL
TRIGL SERPL-MCNC: 100 MG/DL

## 2025-02-17 ENCOUNTER — PATIENT OUTREACH (OUTPATIENT)
Dept: CARE COORDINATION | Facility: CLINIC | Age: 55
End: 2025-02-17
Payer: COMMERCIAL

## 2025-02-20 RX ORDER — SIMVASTATIN 20 MG
20 TABLET ORAL AT BEDTIME
Qty: 90 TABLET | Refills: 3 | Status: SHIPPED | OUTPATIENT
Start: 2025-02-20

## 2025-02-23 NOTE — H&P
Encounter Diagnosis   Name Primary?    Primary osteoarthritis of right knee Yes       ASSESSMENT:  Cleared to proceed with knee replacement surgery.  Exhausted all reasonable conservative treatment for knee arthritis  Risks and benefits of surgical knee replacement discussed and informed consent obtained.      TREATMENT/PLAN:  Proceed to total knee replacement as scheduled, March 6, 2025  Spinal anesthetic  Planned overnight stay, due to afternoon surgery and distance of the home in [place].  Pain medications:  Oxycodone/tramadol/Tylenol  DVT prophylaxis:  Eliquis 2.5 mg b.i.d. times 12 days   Senna/docusate for constipation, Zofran for nausea, methocarbamol for muscle relaxer  OTC antihistamine:  Zyrtec or Xyzal recommended, may also use Benadryl.  Instructed to home meloxicam prior to surgery.  RTO:  10-12 days postop with Matthew Solis PA-C or Dr. Martínez.    Patient seen for Endoscopy    HPI:  Patient is a 53 year old female here for endoscopy. Not taking blood thinning medications. No MI or CVA history. No issues with previous sedation. No recent acute illness.    Review Of Systems    Skin: negative  Ears/Nose/Throat: negative  Respiratory: No shortness of breath, dyspnea on exertion, cough, or hemoptysis  Cardiovascular: negative  Gastrointestinal: negative  Genitourinary: negative  Musculoskeletal: negative  Neurologic: negative  Hematologic/Lymphatic/Immunologic: negative  Endocrine: negative      Past Medical History:   Diagnosis Date    Diabetes (H)     NO ACTIVE PROBLEMS        Past Surgical History:   Procedure Laterality Date    CHOLECYSTECTOMY, LAPOROSCOPIC      Cholecystectomy, Laparoscopic    D & C      LEEP TX, CERVICAL      LEEP TX Cervical    IN TRABECULOPLASTY BY LASER SURGERY Bilateral 2022    [facility] - due to narrow angle    SURGICAL HISTORY OF -       back    SURGICAL HISTORY OF -  Left 2004    tear duct       Family History   Problem Relation Age of Onset    C.A.MARIA L. Father     Diabetes Father     Circulatory Maternal Grandfather     C.A.MARIA L. Paternal Grandmother     C.A.D. Paternal Grandfather     Breast Cancer Sister     Glaucoma No family hx of     Macular Degeneration No family hx of        Social History     Socioeconomic History    Marital status:      Spouse name: Not on file    Number of children: Not on file    Years of education: Not on file    Highest education level: Not on file   Occupational History    Not on file   Tobacco Use    Smoking status: Never    Smokeless tobacco: Never   Vaping Use    Vaping Use: Never used   Substance and Sexual Activity    Alcohol use: Yes     Comment: occas    Drug use: No    Sexual activity: Yes     Partners: Male     Birth control/protection: Pill   Other Topics Concern    Parent/sibling w/ CABG, MI or angioplasty before 65F 55M? Not Asked   Social History Narrative    Not on file     Social  Determinants of Health     Financial Resource Strain: Not on file   Food Insecurity: Not on file   Transportation Needs: Not on file   Physical Activity: Not on file   Stress: Not on file   Social Connections: Not on file   Intimate Partner Violence: Not on file   Housing Stability: Not on file       Current Outpatient Medications   Medication Sig Dispense Refill    losartan (COZAAR) 25 MG tablet Take 1 tablet (25 mg) by mouth daily 90 tablet 3    metFORMIN (GLUCOPHAGE XR) 500 MG 24 hr tablet Take 2 tablets (1,000 mg) by mouth 2 times daily (with meals) for 90 days 360 tablet 3    Multiple Vitamin (MULTI-VITAMIN) per tablet Take 1 tablet by mouth daily.      blood glucose (NO BRAND SPECIFIED) lancets standard Use to test blood sugar 3 times daily or as directed.Please dispense one box. 100 lancet. 1    blood glucose (NO BRAND SPECIFIED) lancets standard Use to test blood sugar once daily or as directed.Please dispense 1 box 1 each 3    blood glucose (NO BRAND SPECIFIED) test strip Use to test blood sugar once daily or as directed. 100 strip 3       Medications and history reviewed    Physical exam:  Vitals: /73   Pulse 88   Temp 97.1  F (36.2  C) (Temporal)   SpO2 96%   BMI= There is no height or weight on file to calculate BMI.    Constitutional: Healthy, alert, non-distressed   Head: Normo-cephalic, atraumatic, no lesions, masses or tenderness   Cardiovascular: RRR, no new murmurs, +S1, +S2 heart sounds, no clicks, rubs or gallops   Respiratory: CTAB, no rales, rhonchi or wheezing, equal chest rise, good respiratory effort   Gastrointestinal: Soft, non-tender, non distended, no rebound rigidity or guarding, no masses or hernias palpated   : Deferred  Musculoskeletal: Moves all extremities, normal  strength, no deformities noted   Skin: No suspicious lesions or rashes   Psychiatric: Mentation appears normal, affect appropriate   Hematologic/Lymphatic/Immunologic: Normal cervical and supraclavicular  lymph nodes   Patient able to get up on table without difficulty.    Labs show:  Results for orders placed or performed during the hospital encounter of 08/29/23 (from the past 24 hour(s))   Glucose by meter   Result Value Ref Range    GLUCOSE BY METER POCT 122 (H) 70 - 99 mg/dL       Assessment: Endoscopy  Plan: Pt cleared for anesthesia for proposed procedure.    Elias Arias, DO

## 2025-03-10 ENCOUNTER — TELEPHONE (OUTPATIENT)
Dept: FAMILY MEDICINE | Facility: CLINIC | Age: 55
End: 2025-03-10
Payer: COMMERCIAL

## 2025-03-10 DIAGNOSIS — E66.01 MORBID OBESITY (H): ICD-10-CM

## 2025-03-10 DIAGNOSIS — E11.9 TYPE 2 DIABETES MELLITUS WITHOUT COMPLICATION, UNSPECIFIED WHETHER LONG TERM INSULIN USE (H): ICD-10-CM

## 2025-03-10 RX ORDER — TIRZEPATIDE 2.5 MG/.5ML
2.5 INJECTION, SOLUTION SUBCUTANEOUS WEEKLY
Qty: 4 ML | Refills: 0 | Status: SHIPPED | OUTPATIENT
Start: 2025-03-10

## 2025-03-10 RX ORDER — TIRZEPATIDE 2.5 MG/.5ML
INJECTION, SOLUTION SUBCUTANEOUS
Qty: 6 ML | Refills: 0 | Status: SHIPPED | OUTPATIENT
Start: 2025-03-10 | End: 2025-03-10

## 2025-03-10 NOTE — TELEPHONE ENCOUNTER
MOUNJARO 2.5 MG/0.5ML SOAJ auto-injector pen 6 mL         Please clarify the directions for Mounjaro Pen 2.5mg/0.5ml. It is typically dosed initially at 2.5mg weekly for 4 weeks, then increased to 5mg weekly.   Please check one:    ______I am aware and wish to continue current treeatment  ______Change to Mounjaro 2.5mg weekly for 4 weeks #1 box + 0 rf. (Please provide a separate RX for Mounjaro 5mg)

## 2025-03-13 ENCOUNTER — MYC MEDICAL ADVICE (OUTPATIENT)
Dept: FAMILY MEDICINE | Facility: CLINIC | Age: 55
End: 2025-03-13
Payer: COMMERCIAL

## 2025-03-13 DIAGNOSIS — E11.9 TYPE 2 DIABETES MELLITUS WITHOUT COMPLICATION, WITHOUT LONG-TERM CURRENT USE OF INSULIN (H): ICD-10-CM

## 2025-03-13 DIAGNOSIS — E66.01 MORBID OBESITY (H): Primary | ICD-10-CM

## 2025-04-29 DIAGNOSIS — R12 HEARTBURN: Primary | ICD-10-CM

## 2025-04-29 DIAGNOSIS — E11.9 TYPE 2 DIABETES MELLITUS WITHOUT COMPLICATION, WITHOUT LONG-TERM CURRENT USE OF INSULIN (H): ICD-10-CM

## 2025-04-29 DIAGNOSIS — E66.01 MORBID OBESITY (H): ICD-10-CM

## 2025-04-29 RX ORDER — TIRZEPATIDE 5 MG/.5ML
INJECTION, SOLUTION SUBCUTANEOUS
Qty: 4 ML | Refills: 5 | OUTPATIENT
Start: 2025-04-29

## 2025-04-29 NOTE — TELEPHONE ENCOUNTER
Need to know patients current weight,  and if she is having any side effects. We'll decide on dose based on her responses. She will need to be seen in clinic in 1 month. I will be retiring in June so she may need to schedule with another available provider.  I recommend that she check out our provider biographies on our website to help decide who her next PCM will be.     Idalia RIZVI, CNP

## 2025-04-29 NOTE — TELEPHONE ENCOUNTER
"RN spoke with pt and relayed provider message.     Pt states she is currently at 286 lb. States she felt like 5 mg dose didn't \"work as well\" last week, but noted she had 1 week like this last month and then other weeks medication seemed to be working better. Pt thinks she's \"doing great\" on medication. Reports mild heartburn, but otherwise denies side effects.    Pt would like to start taking omeprazole. Pt asking about interaction between omeprazole and current medications. No interactions per up to date.     RN assisted in scheduling appointment to establish care with Marlee Mukherjee PA-C in May.     Routing to provider to send Mounjaro and advise if pt is ok to take omeprazole. Pt would like MyChart message with provider response    Carlyn Henley RN  "

## 2025-05-01 RX ORDER — OMEPRAZOLE 20 MG/1
20 CAPSULE, DELAYED RELEASE ORAL DAILY
Qty: 90 CAPSULE | Refills: 0 | Status: SHIPPED | OUTPATIENT
Start: 2025-05-01

## 2025-05-01 NOTE — CONFIDENTIAL NOTE
You've lost 10# in the last month.  That is excellent.  I'll wirte for the Prilosec for you but let's keep you at your current dose for another month and see how the heartburn improved.  If you doing well then we can increase the Mounjaro dose.    Idalia RIZVI, CNP

## 2025-05-19 DIAGNOSIS — E66.01 MORBID OBESITY (H): ICD-10-CM

## 2025-05-19 DIAGNOSIS — E11.9 TYPE 2 DIABETES MELLITUS WITHOUT COMPLICATION, WITHOUT LONG-TERM CURRENT USE OF INSULIN (H): ICD-10-CM

## 2025-05-20 RX ORDER — TIRZEPATIDE 5 MG/.5ML
INJECTION, SOLUTION SUBCUTANEOUS
Qty: 2 ML | Refills: 11 | OUTPATIENT
Start: 2025-05-20

## 2025-05-29 ENCOUNTER — RESULTS FOLLOW-UP (OUTPATIENT)
Dept: FAMILY MEDICINE | Facility: CLINIC | Age: 55
End: 2025-05-29

## 2025-05-29 ENCOUNTER — OFFICE VISIT (OUTPATIENT)
Dept: FAMILY MEDICINE | Facility: CLINIC | Age: 55
End: 2025-05-29
Payer: COMMERCIAL

## 2025-05-29 VITALS
RESPIRATION RATE: 18 BRPM | HEIGHT: 65 IN | DIASTOLIC BLOOD PRESSURE: 87 MMHG | HEART RATE: 71 BPM | WEIGHT: 287.4 LBS | TEMPERATURE: 97.8 F | BODY MASS INDEX: 47.88 KG/M2 | SYSTOLIC BLOOD PRESSURE: 138 MMHG | OXYGEN SATURATION: 98 %

## 2025-05-29 DIAGNOSIS — E66.01 MORBID OBESITY (H): ICD-10-CM

## 2025-05-29 DIAGNOSIS — E11.9 TYPE 2 DIABETES MELLITUS WITHOUT COMPLICATION, WITHOUT LONG-TERM CURRENT USE OF INSULIN (H): Primary | ICD-10-CM

## 2025-05-29 DIAGNOSIS — K21.00 GASTROESOPHAGEAL REFLUX DISEASE WITH ESOPHAGITIS WITHOUT HEMORRHAGE: ICD-10-CM

## 2025-05-29 DIAGNOSIS — E78.5 HYPERLIPIDEMIA LDL GOAL <100: ICD-10-CM

## 2025-05-29 LAB
EST. AVERAGE GLUCOSE BLD GHB EST-MCNC: 140 MG/DL
HBA1C MFR BLD: 6.5 % (ref 0–5.6)

## 2025-05-29 RX ORDER — METFORMIN HYDROCHLORIDE 500 MG/1
1000 TABLET, EXTENDED RELEASE ORAL 2 TIMES DAILY WITH MEALS
Qty: 180 TABLET | Refills: 3 | Status: SHIPPED | OUTPATIENT
Start: 2025-05-29

## 2025-05-29 RX ORDER — SIMVASTATIN 20 MG
20 TABLET ORAL AT BEDTIME
Qty: 90 TABLET | Refills: 3 | Status: SHIPPED | OUTPATIENT
Start: 2025-05-29

## 2025-05-29 RX ORDER — OMEPRAZOLE 20 MG/1
20 CAPSULE, DELAYED RELEASE ORAL DAILY
Qty: 90 CAPSULE | Refills: 3 | Status: SHIPPED | OUTPATIENT
Start: 2025-05-29

## 2025-05-29 RX ORDER — LOSARTAN POTASSIUM 25 MG/1
25 TABLET ORAL DAILY
Qty: 90 TABLET | Refills: 3 | Status: SHIPPED | OUTPATIENT
Start: 2025-05-29

## 2025-05-29 NOTE — NURSING NOTE
Prior to immunization administration, verified patients identity using patient s name and date of birth. Please see Immunization Activity for additional information.     Screening Questionnaire for Adult Immunization    Are you sick today?   No   Do you have allergies to medications, food, a vaccine component or latex?   Yes   Have you ever had a serious reaction after receiving a vaccination?   No   Do you have a long-term health problem with heart, lung, kidney, or metabolic disease (e.g., diabetes), asthma, a blood disorder, no spleen, complement component deficiency, a cochlear implant, or a spinal fluid leak?  Are you on long-term aspirin therapy?   Yes   Do you have cancer, leukemia, HIV/AIDS, or any other immune system problem?   No   Do you have a parent, brother, or sister with an immune system problem?   No   In the past 3 months, have you taken medications that affect  your immune system, such as prednisone, other steroids, or anticancer drugs; drugs for the treatment of rheumatoid arthritis, Crohn s disease, or psoriasis; or have you had radiation treatments?   No   Have you had a seizure, or a brain or other nervous system problem?   No   During the past year, have you received a transfusion of blood or blood    products, or been given immune (gamma) globulin or antiviral drug?   No   For women: Are you pregnant or is there a chance you could become       pregnant during the next month?   No   Have you received any vaccinations in the past 4 weeks?   No     Immunization questionnaire was positive for at least one answer.  Notified PROVIDER AWARE.      Patient instructed to remain in clinic for 15 minutes afterwards, and to report any adverse reactions.     Screening performed by Modesto Del Rio MA on 5/29/2025 at 9:36 AM.

## 2025-05-29 NOTE — PROGRESS NOTES
Assessment & Plan     Type 2 diabetes mellitus without complication, without long-term current use of insulin (H)  Diabetes under good control on current regimen of mounjaro and metformin. A1c today 6.5 previously 7.6. Discussed having her increase the dosage of her mounjaro to help with further blood sugar control as well as weight loss. Patient to follow-up in 3 months.   - HEMOGLOBIN A1C  - Adult Eye  Referral  - BASIC METABOLIC PANEL  - Tirzepatide (MOUNJARO) 7.5 MG/0.5ML SOAJ auto-injector pen  Dispense: 6 mL; Refill: 1  - metFORMIN (GLUCOPHAGE XR) 500 MG 24 hr tablet  Dispense: 180 tablet; Refill: 3  - losartan (COZAAR) 25 MG tablet  Dispense: 90 tablet; Refill: 3  - HEMOGLOBIN A1C  - BASIC METABOLIC PANEL    Morbid obesity (H)  Will increase dosage of mounjaro to help with blood sugar control and weight loss. Patient to continue working on dietary changes and regular exercise.    Hyperlipidemia LDL goal <100  Start simvastatin within the past few months. Due for fasting panel today.   - Lipid panel reflex to direct LDL Fasting  - simvastatin (ZOCOR) 20 MG tablet  Dispense: 90 tablet; Refill: 3  - Lipid panel reflex to direct LDL Fasting    Gastroesophageal reflux disease with esophagitis without hemorrhage  Well managed on current regimen.   - omeprazole (PRILOSEC) 20 MG DR capsule  Dispense: 90 capsule; Refill: 3          Follow-up    Follow-up Visit   Expected date:  Aug 29, 2025 (Approximate)      Follow Up Appointment Details:     Follow-up with whom?: Me    Follow-Up for what?: Chronic Disease f/u    Chronic Disease f/u:  Diabetes  Weight Management       Weight Management New or Return: Return    How?: In Person                 Subjective   Rachele is a 54 year old, presenting for the following health issues:  Diabetes and Establish Care        5/29/2025     8:49 AM   Additional Questions   Roomed by joey     History of Present Illness       Diabetes:   She presents for follow up of diabetes.   She is checking home blood glucose one time daily.   She checks blood glucose before meals.  Blood glucose is never over 200 and never under 70. She is aware of hypoglycemia symptoms including shakiness.    She has no concerns regarding her diabetes at this time.   She is not experiencing numbness or burning in feet, excessive thirst, blurry vision, weight changes or redness, sores or blisters on feet. The patient has had a diabetic eye exam in the last 12 months. Eye exam performed on 2024. Location of last eye exam [facility].        She eats 2-3 servings of fruits and vegetables daily.She consumes 0 sweetened beverage(s) daily.She exercises with enough effort to increase her heart rate 10 to 19 minutes per day.  She exercises with enough effort to increase her heart rate 3 or less days per week.   She is taking medications regularly.        Here to establish care and follow-up on a few conditions    Left ear  - describes the sensation of fluid in the ear for the past week  - denies any ear pain, ear itching, tinnitus, or recent illness  - does have history of allergies    Chronic conditions:  History of diabetes  - currently on metformin 1000mg twice daily and mounjaro 5mg. Initially had worsening heartburn with the mounjaro, however this has been managed with omeprazole.  - takes losartan 25mg daily for blood pressure management   - fasting blood sugars, fasting usually in the 120-140s  - denies any chest pain, shortness of breath, abdominal pain, urinary symptoms, headaches, acute visual changes, or new numbness/tingling  - last eye exam 5/15/2024, no retinopathy    Hyperlipidemia   - currently on simvastatin 20mg daily  The 10-year ASCVD risk score (Clotilde AVERY, et al., 2019) is: 3.4%    Values used to calculate the score:      Age: 54 years      Sex: Female      Is Non- : No      Diabetic: Yes      Tobacco smoker: No      Systolic Blood Pressure: 138 mmHg      Is BP treated: No      " HDL Cholesterol: 62 mg/dL      Total Cholesterol: 194 mg/dL    Preventatives:  Pap smear: 10/24/2024, NILM HPV-  Mammogram: 10/18/2024, negative  Colonoscopy: 8/29/2023  Vaccines: agreeable to hepatitis B vaccination        Objective    /87 (BP Location: Left arm, Patient Position: Sitting, Cuff Size: Adult Regular)   Pulse 71   Temp 97.8  F (36.6  C) (Temporal)   Resp 18   Ht 1.638 m (5' 4.5\")   Wt 130.4 kg (287 lb 6.4 oz)   LMP 10/28/2022 (Exact Date)   SpO2 98%   BMI 48.57 kg/m    Body mass index is 48.57 kg/m .  Physical Exam   GENERAL: alert and no distress  EYES: Eyes grossly normal to inspection, PERRL and conjunctivae and sclerae normal  HENT: ear canals and TM's normal, nose and mouth without ulcers or lesions  NECK: no adenopathy, no asymmetry, masses, or scars  RESP: lungs clear to auscultation - no rales, rhonchi or wheezes  CV: regular rate and rhythm, normal S1 S2, no S3 or S4, no murmur, click or rub, no peripheral edema  MS: no gross musculoskeletal defects noted, no edema  SKIN: no suspicious lesions or rashes  NEURO: Normal strength and tone, mentation intact and speech normal  PSYCH: mentation appears normal, affect normal/bright        Signed Electronically by: Marlee Mukherjee    "

## 2025-05-29 NOTE — PATIENT INSTRUCTIONS
At [facility], we strive to deliver an exceptional experience to you, every time we see you. If you receive a survey, please let us know what we are doing well and/or what we could improve upon, as we do value your feedback.  If you have MyChart, you can expect to receive results automatically within 24 hours of their completion.  Your provider will send a note interpreting your results as well.   If you do not have MyChart, you should receive your results in about a week by mail.    Your care team:                            Family Medicine Internal Medicine   MD Edgar Melendez, MD Brianna Awad, MD Ethan Rosales, MD Estefanía Mcguire, PA-C BELKYS Flores, ELLEN Leblanc, MD Pediatrics   MD Pauline Zimmerman, MD Marlee Mukherjee, PAMACIEL May APRN CNP   MD Beatrice Vang, MD Christina Pittman, CNP      Same-Day Provider (No follow-up visits)    SELVIN Stein PA-C     Clinic hours: Monday - Thursday 7 am-6 pm; Fridays 7 am-5 pm.   Urgent care: Monday - Friday 10 am- 8 pm; Saturday and Sunday 9 am-5 pm.    Clinic: [phone]       [facility]: Monday - Thursday 8 am - 7 pm; Friday 8 am - 6 pm  [facility]: [phone]     [facility] Scheduling: Monday - Friday 7 am - 7 pm; Saturday 7 am - 3:30 pm  [phone]) [place] : [phone]

## 2025-06-02 ENCOUNTER — PATIENT OUTREACH (OUTPATIENT)
Dept: CARE COORDINATION | Facility: CLINIC | Age: 55
End: 2025-06-02
Payer: COMMERCIAL

## 2025-07-19 DIAGNOSIS — E11.9 TYPE 2 DIABETES MELLITUS WITHOUT COMPLICATION, WITHOUT LONG-TERM CURRENT USE OF INSULIN (H): ICD-10-CM

## 2025-07-21 RX ORDER — LANCETS 33 GAUGE
EACH MISCELLANEOUS
Qty: 100 EACH | Refills: 2 | Status: SHIPPED | OUTPATIENT
Start: 2025-07-21

## 2025-07-30 ENCOUNTER — PATIENT OUTREACH (OUTPATIENT)
Dept: CARE COORDINATION | Facility: CLINIC | Age: 55
End: 2025-07-30
Payer: COMMERCIAL

## (undated) DEVICE — PAD CHUX UNDERPAD 23X24" 7136

## (undated) DEVICE — KIT ENDO FIRST STEP DISINFECTANT 200ML W/POUCH EP-4

## (undated) RX ORDER — FENTANYL CITRATE 50 UG/ML
INJECTION, SOLUTION INTRAMUSCULAR; INTRAVENOUS
Status: DISPENSED
Start: 2023-08-29